# Patient Record
Sex: FEMALE | Race: WHITE | Employment: FULL TIME | URBAN - METROPOLITAN AREA
[De-identification: names, ages, dates, MRNs, and addresses within clinical notes are randomized per-mention and may not be internally consistent; named-entity substitution may affect disease eponyms.]

---
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
## 2022-01-25 ENCOUNTER — TELEPHONE (OUTPATIENT)
Dept: FAMILY MEDICINE CLINIC | Age: 22
End: 2022-01-25

## 2022-01-25 DIAGNOSIS — Z11.1 SCREENING-PULMONARY TB: Primary | ICD-10-CM

## 2022-01-28 ENCOUNTER — TELEPHONE (OUTPATIENT)
Dept: FAMILY MEDICINE CLINIC | Age: 22
End: 2022-01-28

## 2022-01-28 NOTE — TELEPHONE ENCOUNTER
As previously advised, patient needs to come either to office or go to the hospital lab for a blood draw to ensure she does not have TB. Once this is completed, I can fax the form to her employer. Please re-advise patient. Thanks!     Electronically signed by Benjamin Pierre MD on 1/28/22 at 9:40 AM EST

## 2022-01-28 NOTE — TELEPHONE ENCOUNTER
----- Message from Terri Ramsey sent at 1/28/2022  7:35 AM EST -----  Subject: Message to Provider    QUESTIONS  Information for Provider? Patient is calling, her employer faxed a form   that needs filled out and returned so she can work. She would like to   check the status of this form. Please call patient and advise.   ---------------------------------------------------------------------------  --------------  CALL BACK INFO  What is the best way for the office to contact you? OK to leave message on   voicemail  Preferred Call Back Phone Number? [phone]  ---------------------------------------------------------------------------  --------------  SCRIPT ANSWERS  Relationship to Patient?  Self

## 2022-02-01 ENCOUNTER — TELEPHONE (OUTPATIENT)
Dept: FAMILY MEDICINE CLINIC | Age: 22
End: 2022-02-01

## 2022-02-01 NOTE — TELEPHONE ENCOUNTER
Patient advised to get TB test again. Given number to [facility]. E's lab for hours. [phone]. Advised I can fax her paper back when this is finalized.     Electronically signed by Adonis Ridley MD on 2/1/22 at 4:25 PM EST

## 2022-02-01 NOTE — TELEPHONE ENCOUNTER
Patient phoned would like the paper from her work that was faxed to the office back  I do not see the paper   Do you have it  Please advise  Thank you April

## 2022-02-08 ENCOUNTER — OFFICE VISIT (OUTPATIENT)
Dept: FAMILY MEDICINE CLINIC | Age: 22
End: 2022-02-08
Payer: MEDICAID

## 2022-02-08 ENCOUNTER — HOSPITAL ENCOUNTER (EMERGENCY)
Age: 22
Discharge: HOME | End: 2022-02-08
Payer: MEDICAID

## 2022-02-08 VITALS
OXYGEN SATURATION: 98 % | BODY MASS INDEX: 31.98 KG/M2 | HEART RATE: 111 BPM | SYSTOLIC BLOOD PRESSURE: 134 MMHG | DIASTOLIC BLOOD PRESSURE: 78 MMHG | WEIGHT: 199 LBS | TEMPERATURE: 98.1 F | HEIGHT: 66 IN

## 2022-02-08 VITALS
SYSTOLIC BLOOD PRESSURE: 118 MMHG | RESPIRATION RATE: 14 BRPM | DIASTOLIC BLOOD PRESSURE: 66 MMHG | TEMPERATURE: 98.5 F | HEART RATE: 96 BPM | WEIGHT: 190 LBS | OXYGEN SATURATION: 99 % | BODY MASS INDEX: 30.67 KG/M2

## 2022-02-08 DIAGNOSIS — Z11.1 SCREENING-PULMONARY TB: Primary | ICD-10-CM

## 2022-02-08 DIAGNOSIS — Z20.822 ENCOUNTER FOR LABORATORY TESTING FOR COVID-19 VIRUS: Primary | ICD-10-CM

## 2022-02-08 DIAGNOSIS — F33.1 MODERATE EPISODE OF RECURRENT MAJOR DEPRESSIVE DISORDER (HCC): ICD-10-CM

## 2022-02-08 PROCEDURE — 99212 OFFICE O/P EST SF 10 MIN: CPT | Performed by: FAMILY MEDICINE

## 2022-02-08 PROCEDURE — G8484 FLU IMMUNIZE NO ADMIN: HCPCS | Performed by: FAMILY MEDICINE

## 2022-02-08 PROCEDURE — 99283 EMERGENCY DEPT VISIT LOW MDM: CPT

## 2022-02-08 PROCEDURE — U0003 INFECTIOUS AGENT DETECTION BY NUCLEIC ACID (DNA OR RNA); SEVERE ACUTE RESPIRATORY SYNDROME CORONAVIRUS 2 (SARS-COV-2) (CORONAVIRUS DISEASE [COVID-19]), AMPLIFIED PROBE TECHNIQUE, MAKING USE OF HIGH THROUGHPUT TECHNOLOGIES AS DESCRIBED BY CMS-2020-01-R: HCPCS

## 2022-02-08 PROCEDURE — G8427 DOCREV CUR MEDS BY ELIG CLIN: HCPCS | Performed by: FAMILY MEDICINE

## 2022-02-08 PROCEDURE — U0005 INFEC AGEN DETEC AMPLI PROBE: HCPCS

## 2022-02-08 PROCEDURE — 99213 OFFICE O/P EST LOW 20 MIN: CPT | Performed by: FAMILY MEDICINE

## 2022-02-08 PROCEDURE — 1036F TOBACCO NON-USER: CPT | Performed by: FAMILY MEDICINE

## 2022-02-08 PROCEDURE — G8417 CALC BMI ABV UP PARAM F/U: HCPCS | Performed by: FAMILY MEDICINE

## 2022-02-08 RX ORDER — ESCITALOPRAM OXALATE 5 MG/1
5 TABLET ORAL DAILY
Qty: 30 TABLET | Refills: 0 | Status: CANCELLED | OUTPATIENT
Start: 2022-02-08

## 2022-02-08 RX ORDER — SERTRALINE HYDROCHLORIDE 25 MG/1
25 TABLET, FILM COATED ORAL DAILY
Qty: 30 TABLET | Refills: 3 | Status: SHIPPED
Start: 2022-02-08 | End: 2022-04-08

## 2022-02-08 NOTE — ED PROVIDER NOTES
[facility]  Department of Emergency Medicine   ED  Encounter Note  Admit Date/RoomTime: 2022  4:01 PM  ED Room: [place] [address]  NAME: Mo Pedraza  : 2000  MRN: [id number]     Chief Complaint:  Other (job required tb before hire. )    [facility]        Mo Pedraza is a 25 y.o. female who presents to the ED by private vehicle for stating that she needs a TB test required to start her job at a . She also states she needs a COVID-19 test.. The complaint has been none and are none in severity. Patient denies any symptoms. Denies being exposed to COVID-19 or to tuberculosis. She denies any shortness of breath, chest pain, lightness, dizziness, neck pain, night sweats, fevers, chills, abdominal pain, numbness, tingling, weakness. ROS   Pertinent positives and negatives are stated within HPI, all other systems reviewed and are negative. Past Medical History:  has a past medical history of Moderate episode of recurrent major depressive disorder ([facility] [address] Surgical History:  has no past surgical history on file. Social History:  reports that she is a non-smoker but has been exposed to tobacco smoke. She has never used smokeless tobacco. She reports that she does not drink alcohol and does not use drugs. Family History: family history includes Kidney Disease in her maternal grandfather; Other in her maternal grandmother. Allergies: Patient has no known allergies. PHYSICAL EXAM   Oxygen Saturation Interpretation: Normal on room air analysis. ED Triage Vitals   BP Temp Temp src Heart Rate Resp SpO2 Height Weight   -- -- -- 22 1557 22 1557 22 1557 -- 22 1559      (!) 115 20 95 %  190 lb (86.2 kg)         Physical Exam  Constitutional/General: Alert and oriented x3, well appearing, non toxic  HEENT:  NC/NT. PERRLA,  Airway patent.   Neck: Supple, full ROM, non tender to palpation in the midline, no stridor, no crepitus, no meningeal signs  Respiratory: Lungs clear to auscultation bilaterally, no wheezes, rales, or rhonchi. Not in respiratory distress  CV:  Regular rate. Regular rhythm. No murmurs, gallops, or rubs. 2+ distal pulses  Chest: No chest wall tenderness  GI:  Abdomen Soft, Non tender, Non distended. +BS. No rebound, guarding, or rigidity. No pulsatile masses. Musculoskeletal: Moves all extremities x 4. Warm and well perfused, no clubbing, cyanosis, or edema. Capillary refill <3 seconds  Integument: skin warm and dry. No rashes. Lymphatic: no lymphadenopathy noted  Neurologic: GCS 15, no focal deficits, symmetric strength 5/5 in the upper and lower extremities bilaterally  Psychiatric: Normal Affect    Lab / Imaging Results   (All laboratory and radiology results have been personally reviewed by myself)  Labs:  No results found for this visit on 02/08/22. Imaging: All Radiology results interpreted by Radiologist unless otherwise noted. No orders to display       ED Course / Medical Decision Making   Medications - No data to display     Re-examination:  2/8/22       Time: 1610-spoke with lab and states that we cannot do the blood work for the [address] [place] [address] Pky for TB due to it has to be a same-day send out and be completed in the morning. They state they already did the send out for the day. Discussed with patient that [facility] [address] due to her being a resident update immunizations and TB screenings. Consult(s):   None    Procedure(s):   none    MDM:   Patient presents to the ED for needing a Covid test TB test to start a job at [employer]. Differential diagnoses included but not limited to COVID-19 versus TB. Workup in the ED revealed patient states she was not exposed to TB or COVID-19. She has no symptoms. Vital signs are stable. COVID-19 test is pending at this time. Unfortunately unable to do QuantiFERON TB test due to the time a day at this facility. Referred her to [facility] [address] for further treatment due to patient living in [place]. Patient continues to be non-toxic on re-evaluation. Findings were discussed with the patient and reasons to immediately return to the ED were articulated to them. They will follow-up with their PMD.      Plan of Care/Counseling:  SONIA Zhu CNP reviewed today's visit with the patient in addition to providing specific details for the plan of care and counseling regarding the diagnosis and prognosis. Questions are answered at this time and are agreeable with the plan. ASSESSMENT     1. Encounter for laboratory testing for COVID-19 virus      PLAN   Discharged home. Patient condition is stable    New Medications     Discharge Medication List as of 2/8/2022  4:25 PM        Electronically signed by SONIA Zhu CNP   DD: 2/8/22  **This report was transcribed using voice recognition software. Every effort was made to ensure accuracy; however, inadvertent computerized transcription errors may be present.   END OF ED PROVIDER NOTE       SONIA Zhu CNP  02/08/22 0630

## 2022-02-08 NOTE — PROGRESS NOTES
Attending Physician Statement    S:   Chief Complaint   Patient presents with    Depression     F/U    Other     immunization      21/F - depression/anxiety - denies SI/HI  O: Blood pressure 134/78, pulse (!) 111, temperature 98.1 °F (36.7 °C), temperature source Temporal, height 5' 6\" (1.676 m), weight 199 lb (90.3 kg), SpO2 98 %, not currently breastfeeding. Exam:   Heart - RRR   Lungs - clear   Psych - normal mood/affect, mildly anxious  A: As above  P:  D/C Lexapro   Start Zoloft 25mg QD    Follow-up as ordered    I have discussed the case, including pertinent history and exam findings with the resident. I agree with the documented assessment and plan.

## 2022-02-08 NOTE — PATIENT INSTRUCTIONS
Call in the meantime if you have any issues. TIPS TO COPE WITH STRESS    Stress can have negative effects on your physical and emotional health. Here are some tips to help you cope with stress:    1. RELAX. Find at least one activity that you find relaxing. Try to avoid TV, computer, or smart devices for relaxation. Do relaxation breathing and mental imagery. Take slow breaths in through your nose and out through your mouth. Imagine your favorite scent while inhaling, and blowing out a candle as you exhale. 2.   THINK ABOUT YOUR THINKING. Thoughts that are overly negative or pessimistic can contribute to stress. Avoid making assumptions. Focus on the facts available to you. 3.    PROBLEM-SOLVE. Feeling overwhelmed with daily hassles and problems can leave you feeling stressed. Focus on solving the problems that are within your control. Gather needed information on the problem, consider all possible solutions, and weigh the pros and cons of each option. 4.    GET PLENTY OF SLEEP. Most of adults need 6-8 hours or more of sleep per night. Wind down before bed. Avoid TV or smart devices in bed. Keep a regular sleep schedule. Limit caffeine. 5.    EAT HEALTHY. Limit fat, sugar, and sodium. Eat a balanced diet of fruits, vegetables, whole grains, and lean protein. 6. EXERCISE REGULARLY. Talk to your doctor about a safe exercise program.        7.    AVOID DRUGS AND ALCOHOL. While they may seem to help temporarily, drugs and alcohol will make existing stress worse. 8.    CONNECT WITH OTHERS. Talk with friends or family about your concerns. Socialize with others. 9.    TAKE A BREAK. If you are feeling overwhelmed with commitments, evaluate what is most important. What can you cut out? Where can you say \"no?\"      10. CONSIDER PROFESSIONAL HELP. If you continue to feel like you cannot manage stress, talk further with your doctor.   Or, consider talk therapy from a psychologist or professional counselor. If you have any thoughts of harming yourself or others, call 911 immediately.

## 2022-02-08 NOTE — PROGRESS NOTES
[address] [facility]  Department of Family Medicine  Family Medicine Residency Program      Patient:  Maico Gracia 25 y.o. female     Date of Service: 2/8/22      Chief complaint:   Chief Complaint   Patient presents with    Depression     F/U    Other     immunization         History of Present Illness   The patient is a 25 y.o. female with a PMH of anxiety and depression who presents to the clinic for the following medical concerns:     Anxiety and depression: Has been taking 5mg Lexapro for past month. No side effects but it is not helping. 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 Still depressed. Cant do counseling because of her job. Does have decreased motivation. No SI at this time but just feeling down. Still doesn't have good support from mother or any friends. She isolates because she doesn't like to feel judged by others. New job not giving her enough time off to get counseling. Advised that we can do virtuals if she can get 15 minutes free. Amenable to trying different medication.  Need for TB test: Hasn't been able to get time off work to get this done. Past Medical History:      Diagnosis Date    Moderate episode of recurrent major depressive disorder ([facility] [address] 11/26/2021       Past Surgical History:    History reviewed. No pertinent surgical history. Allergies:    Patient has no known allergies. Social History:   Social History     Tobacco Use    Smoking status: Passive Smoke Exposure - Never Smoker    Smokeless tobacco: Never Used   Substance Use Topics    Alcohol use: No        Family History:       Problem Relation Age of Onset    Other Maternal Grandmother         enlarged thyroid    Kidney Disease Maternal Grandfather        Reviewof Systems:   Review of Systems Negative unless otherwise stated in HPI.     Physical Exam   Vitals: /78 (Site: Left Upper Arm, Position: Sitting, Cuff Size: Medium Adult)   Pulse (!) 111 Temp 98.1 °F (36.7 °C) (Temporal)   Ht 5' 6\" (1.676 m)   Wt 199 lb (90.3 kg)   LMP 01/17/2022   SpO2 98%   BMI 32.12 kg/m²        Physical Exam  Constitutional:       Appearance: Normal appearance. Eyes:      Extraocular Movements: Extraocular movements intact. Conjunctiva/sclera: Conjunctivae normal.   Cardiovascular:      Rate and Rhythm: Normal rate and regular rhythm. Heart sounds: No murmur heard. No friction rub. No gallop. Pulmonary:      Effort: Pulmonary effort is normal.      Breath sounds: Normal breath sounds. Abdominal:      General: Abdomen is flat. Palpations: Abdomen is soft. Musculoskeletal:         General: No swelling or tenderness. Cervical back: Normal range of motion and neck supple. Right lower leg: No edema. Left lower leg: No edema. Skin:     General: Skin is warm and dry. Neurological:      Mental Status: She is alert and oriented to person, place, and time. Mental status is at baseline. Psychiatric:         Mood and Affect: Mood normal.         Thought Content: Thought content normal.          Assessment and Plan       1. Moderate episode of recurrent major depressive disorder ([address]  Uncontrolled  Stop lexapro  Start sertraline 25mg  Advised counseling would be of benefit, but job is limiting  Will do virtual visits in between to allow for maintaining her job  No SI at this time  Follow up closely, may uptitrate medication via phone call in next few weeks    2. Screening-pulmonary TB  Cannot to [facility] in office, prefer over skin test due to patient unable to follow up with work  Advised to go to hospital lab, was given phone number on prior telephone encounter to determine when she can get this done  - T-Spot TB Test; Future        Return to Office: Return in about 1 month (around 3/8/2022) for vv f/u depression.       Medication List:    Current Outpatient Medications   Medication Sig Dispense Refill    sertraline (ZOLOFT) 25 MG tablet Take 1 tablet by mouth daily 30 tablet 3    ibuprofen (IBU) 800 MG tablet Take 1 tablet by mouth every 8 hours as needed for Pain 21 tablet 0     No current facility-administered medications for this visit.         Duarte Gilliland MD

## 2022-02-09 LAB — SARS-COV-2, PCR: NOT DETECTED

## 2022-02-28 ENCOUNTER — HOSPITAL ENCOUNTER (EMERGENCY)
Age: 22
Discharge: HOME | End: 2022-02-28
Payer: MEDICAID

## 2022-02-28 VITALS
OXYGEN SATURATION: 99 % | TEMPERATURE: 98.1 F | WEIGHT: 190 LBS | HEIGHT: 66 IN | HEART RATE: 99 BPM | RESPIRATION RATE: 16 BRPM | BODY MASS INDEX: 30.53 KG/M2 | SYSTOLIC BLOOD PRESSURE: 130 MMHG | DIASTOLIC BLOOD PRESSURE: 77 MMHG

## 2022-02-28 DIAGNOSIS — J02.9 ACUTE PHARYNGITIS, UNSPECIFIED ETIOLOGY: Primary | ICD-10-CM

## 2022-02-28 LAB — STREP GRP A PCR: NEGATIVE

## 2022-02-28 PROCEDURE — 99284 EMERGENCY DEPT VISIT MOD MDM: CPT

## 2022-02-28 PROCEDURE — 6370000000 HC RX 637 (ALT 250 FOR IP): Performed by: PHYSICIAN ASSISTANT

## 2022-02-28 PROCEDURE — 87880 STREP A ASSAY W/OPTIC: CPT

## 2022-02-28 RX ORDER — NAPROXEN 500 MG/1
500 TABLET ORAL 2 TIMES DAILY PRN
Qty: 28 TABLET | Refills: 0 | Status: SHIPPED | OUTPATIENT
Start: 2022-02-28 | End: 2022-04-08

## 2022-02-28 RX ORDER — NAPROXEN 500 MG/1
500 TABLET ORAL ONCE
Status: COMPLETED | OUTPATIENT
Start: 2022-02-28 | End: 2022-02-28

## 2022-02-28 RX ORDER — AMOXICILLIN 500 MG/1
500 CAPSULE ORAL 2 TIMES DAILY
Qty: 14 CAPSULE | Refills: 0 | Status: SHIPPED | OUTPATIENT
Start: 2022-02-28 | End: 2022-03-07

## 2022-02-28 RX ADMIN — NAPROXEN 500 MG: 500 TABLET ORAL at 18:34

## 2022-02-28 ASSESSMENT — ENCOUNTER SYMPTOMS
SHORTNESS OF BREATH: 0
COLOR CHANGE: 0
SORE THROAT: 1
SINUS PAIN: 0
CHEST TIGHTNESS: 0
SINUS PRESSURE: 0
TROUBLE SWALLOWING: 0
FACIAL SWELLING: 0
COUGH: 0
BACK PAIN: 0

## 2022-02-28 ASSESSMENT — PAIN SCALES - GENERAL
PAINLEVEL_OUTOF10: 7
PAINLEVEL_OUTOF10: 7

## 2022-02-28 ASSESSMENT — PAIN DESCRIPTION - LOCATION: LOCATION: THROAT

## 2022-02-28 ASSESSMENT — PAIN DESCRIPTION - FREQUENCY: FREQUENCY: CONTINUOUS

## 2022-02-28 NOTE — ED PROVIDER NOTES
[facility]        Department of Emergency Medicine   ED  Provider Note  Admit Date/RoomTime: 2/28/2022  6:05 PM  ED Room: [place] [address]  HPI:  2/28/22, Time: 6:58 PM EST      The patient is an otherwise healthy 68-year-old female presenting the emergency department with a 2-day history of a sore throat. Patient states she feels like she has strep throat. She has had this in the past.  States it is painful to swallow. She does not take anything for her symptoms. She denies any ill contacts. She also denies any drooling, voice changes, fevers or chills, cough, congestion, chest pain, shortness of breath. The history is provided by the patient. No  was used. REVIEW OF SYSTEMS:  Review of Systems   Constitutional: Negative for activity change, chills, fatigue and fever. HENT: Positive for sore throat. Negative for congestion, ear pain, facial swelling, sinus pressure, sinus pain and trouble swallowing. Respiratory: Negative for cough, chest tightness and shortness of breath. Cardiovascular: Negative for chest pain, palpitations and leg swelling. Genitourinary: Negative for dysuria, flank pain, frequency and hematuria. Musculoskeletal: Negative for back pain, neck pain and neck stiffness. Skin: Negative for color change, pallor and rash. Neurological: Negative for dizziness, weakness, light-headedness and headaches. Psychiatric/Behavioral: Negative for agitation, behavioral problems and confusion. Pertinent positives and negatives are stated within HPI, all other systems reviewed and are negative.      --------------------------------------------- PAST HISTORY ---------------------------------------------  Past Medical History:  has a past medical history of Moderate episode of recurrent major depressive disorder ([facility] [address] Past Surgical History:  has no past surgical history on file.     Social History:  reports that she is a non-smoker but has been exposed to tobacco smoke. She has never used smokeless tobacco. She reports that she does not drink alcohol and does not use drugs. Family History: family history includes Kidney Disease in her maternal grandfather; Other in her maternal grandmother. The patients home medications have been reviewed. Allergies: Patient has no known allergies. -------------------------------------------------- RESULTS -------------------------------------------------  All laboratory and radiology results have been personally reviewed by myself   LABS:  Results for orders placed or performed during the hospital encounter of 02/28/22   Strep Screen Group A Throat    Specimen: Throat   Result Value Ref Range    Strep Grp A PCR Negative Negative       RADIOLOGY:  Interpreted by Radiologist.  No orders to display       ------------------------- NURSING NOTES AND VITALS REVIEWED ---------------------------   The nursing notes within the ED encounter and vital signs as below have been reviewed. /77   Pulse 99   Temp 98.1 °F (36.7 °C) (Oral)   Resp 16   Ht 5' 6\" (1.676 m)   Wt 190 lb (86.2 kg)   SpO2 99%   BMI 30.67 kg/m²   Oxygen Saturation Interpretation: Normal      ---------------------------------------------------PHYSICAL EXAM--------------------------------------    Physical Exam  Vitals and nursing note reviewed. Constitutional:       General: She is not in acute distress. Appearance: Normal appearance. She is well-developed. HENT:      Head: Normocephalic and atraumatic. Mouth/Throat:      Pharynx: Oropharyngeal exudate and posterior oropharyngeal erythema present. Comments: Diffuse erythema of the posterior oropharynx. No significant tonsillar edema. Right-sided tonsillar exudates. Midline uvula. Neck:      Vascular: No JVD. Trachea: No tracheal deviation. Comments: Right-sided cervical lymphadenopathy. Cardiovascular:      Rate and Rhythm: Normal rate and regular rhythm.       Heart sounds: Normal heart sounds. No murmur heard. Pulmonary:      Effort: Pulmonary effort is normal. No respiratory distress. Breath sounds: Normal breath sounds. Musculoskeletal:         General: No deformity. Normal range of motion. Cervical back: Normal range of motion and neck supple. Skin:     General: Skin is warm and dry. Capillary Refill: Capillary refill takes less than 2 seconds. Coloration: Skin is not pale. Findings: No erythema. Neurological:      Mental Status: She is alert and oriented to person, place, and time. Cranial Nerves: No cranial nerve deficit. Psychiatric:         Mood and Affect: Mood normal.         Behavior: Behavior normal.         Thought Content: Thought content normal.            ------------------------------ ED COURSE/MEDICAL DECISION MAKING----------------------  Medications   naproxen (NAPROSYN) tablet 500 mg (500 mg Oral Given 2/28/22 1834)         ED COURSE:      No orders to display         Procedures:  Procedures     Medical Decision Making:   MDM   79-year-old female presenting the ED with a sore throat for the last several days. Patient is afebrile and hemodynamically stable on arrival.  She does have erythema and exudates. Rapid strep is negative but patient states she has a strong history of frequent strep infections. There is concern for false negative. She has no signs of peritonsillar abscess. Patient will be treated with amoxicillin and naproxen. She is to return with any new or worsening symptoms. Counseling: The emergency provider has spoken with the patient and discussed todays results, in addition to providing specific details for the plan of care and counseling regarding the diagnosis and prognosis. Questions are answered at this time and they are agreeable with the plan.      --------------------------------- IMPRESSION AND DISPOSITION ---------------------------------    IMPRESSION  1.  Acute pharyngitis, unspecified etiology        DISPOSITION  Disposition: Discharge to home  Patient condition is good      Electronically signed by Dotty Navarro PA-C   DD: 2/28/22  **This report was transcribed using voice recognition software. Every effort was made to ensure accuracy; however, inadvertent computerized transcription errors may be present.   END OF ED PROVIDER NOTE          Dotty Navarro PA-C  02/28/22 1463

## 2022-04-08 ENCOUNTER — HOSPITAL ENCOUNTER (EMERGENCY)
Age: 22
Discharge: HOME | End: 2022-04-09
Attending: EMERGENCY MEDICINE
Payer: MEDICAID

## 2022-04-08 DIAGNOSIS — E87.6 HYPOKALEMIA: ICD-10-CM

## 2022-04-08 DIAGNOSIS — R10.9 ABDOMINAL PAIN, UNSPECIFIED ABDOMINAL LOCATION: ICD-10-CM

## 2022-04-08 DIAGNOSIS — R11.2 NAUSEA VOMITING AND DIARRHEA: Primary | ICD-10-CM

## 2022-04-08 DIAGNOSIS — R19.7 NAUSEA VOMITING AND DIARRHEA: Primary | ICD-10-CM

## 2022-04-08 LAB
ALBUMIN SERPL-MCNC: 4.6 G/DL (ref 3.5–5.2)
ALP BLD-CCNC: 71 U/L (ref 35–104)
ALT SERPL-CCNC: 11 U/L (ref 0–32)
ANION GAP SERPL CALCULATED.3IONS-SCNC: 13 MMOL/L (ref 7–16)
AST SERPL-CCNC: 19 U/L (ref 0–31)
BACTERIA: ABNORMAL /HPF
BASOPHILS ABSOLUTE: 0.02 E9/L (ref 0–0.2)
BASOPHILS RELATIVE PERCENT: 0.4 % (ref 0–2)
BILIRUB SERPL-MCNC: 0.4 MG/DL (ref 0–1.2)
BILIRUBIN URINE: NEGATIVE
BLOOD, URINE: ABNORMAL
BUN BLDV-MCNC: 8 MG/DL (ref 6–20)
CALCIUM SERPL-MCNC: 9.4 MG/DL (ref 8.6–10.2)
CHLORIDE BLD-SCNC: 106 MMOL/L (ref 98–107)
CLARITY: CLEAR
CO2: 20 MMOL/L (ref 22–29)
COLOR: YELLOW
CREAT SERPL-MCNC: 0.7 MG/DL (ref 0.5–1)
EOSINOPHILS ABSOLUTE: 0.03 E9/L (ref 0.05–0.5)
EOSINOPHILS RELATIVE PERCENT: 0.5 % (ref 0–6)
GFR AFRICAN AMERICAN: >60
GFR NON-AFRICAN AMERICAN: >60 ML/MIN/1.73
GLUCOSE BLD-MCNC: 132 MG/DL (ref 74–99)
GLUCOSE URINE: NEGATIVE MG/DL
HCG, URINE, POC: NEGATIVE
HCT VFR BLD CALC: 41.3 % (ref 34–48)
HEMOGLOBIN: 13.9 G/DL (ref 11.5–15.5)
IMMATURE GRANULOCYTES #: 0.01 E9/L
IMMATURE GRANULOCYTES %: 0.2 % (ref 0–5)
KETONES, URINE: ABNORMAL MG/DL
LACTIC ACID: 0.7 MMOL/L (ref 0.5–2.2)
LEUKOCYTE ESTERASE, URINE: NEGATIVE
LIPASE: 29 U/L (ref 13–60)
LYMPHOCYTES ABSOLUTE: 1.22 E9/L (ref 1.5–4)
LYMPHOCYTES RELATIVE PERCENT: 22 % (ref 20–42)
Lab: NORMAL
MCH RBC QN AUTO: 28 PG (ref 26–35)
MCHC RBC AUTO-ENTMCNC: 33.7 % (ref 32–34.5)
MCV RBC AUTO: 83.3 FL (ref 80–99.9)
MONOCYTES ABSOLUTE: 0.71 E9/L (ref 0.1–0.95)
MONOCYTES RELATIVE PERCENT: 12.8 % (ref 2–12)
MUCUS: PRESENT /LPF
NEGATIVE QC PASS/FAIL: NORMAL
NEUTROPHILS ABSOLUTE: 3.56 E9/L (ref 1.8–7.3)
NEUTROPHILS RELATIVE PERCENT: 64.1 % (ref 43–80)
NITRITE, URINE: NEGATIVE
PDW BLD-RTO: 12 FL (ref 11.5–15)
PH UA: 6 (ref 5–9)
PLATELET # BLD: 266 E9/L (ref 130–450)
PMV BLD AUTO: 9.4 FL (ref 7–12)
POSITIVE QC PASS/FAIL: NORMAL
POTASSIUM SERPL-SCNC: 3.4 MMOL/L (ref 3.5–5)
PROTEIN UA: NEGATIVE MG/DL
RBC # BLD: 4.96 E12/L (ref 3.5–5.5)
RBC UA: ABNORMAL /HPF (ref 0–2)
SODIUM BLD-SCNC: 139 MMOL/L (ref 132–146)
SPECIFIC GRAVITY UA: >=1.03 (ref 1–1.03)
STREP GRP A PCR: NEGATIVE
TOTAL PROTEIN: 8.2 G/DL (ref 6.4–8.3)
UROBILINOGEN, URINE: 0.2 E.U./DL
WBC # BLD: 5.6 E9/L (ref 4.5–11.5)
WBC UA: ABNORMAL /HPF (ref 0–5)

## 2022-04-08 PROCEDURE — 96372 THER/PROPH/DIAG INJ SC/IM: CPT

## 2022-04-08 PROCEDURE — 6370000000 HC RX 637 (ALT 250 FOR IP): Performed by: NURSE PRACTITIONER

## 2022-04-08 PROCEDURE — 6360000002 HC RX W HCPCS: Performed by: NURSE PRACTITIONER

## 2022-04-08 PROCEDURE — 81001 URINALYSIS AUTO W/SCOPE: CPT

## 2022-04-08 PROCEDURE — 36415 COLL VENOUS BLD VENIPUNCTURE: CPT

## 2022-04-08 PROCEDURE — 2580000003 HC RX 258: Performed by: NURSE PRACTITIONER

## 2022-04-08 PROCEDURE — 80053 COMPREHEN METABOLIC PANEL: CPT

## 2022-04-08 PROCEDURE — 85025 COMPLETE CBC W/AUTO DIFF WBC: CPT

## 2022-04-08 PROCEDURE — 83690 ASSAY OF LIPASE: CPT

## 2022-04-08 PROCEDURE — 99283 EMERGENCY DEPT VISIT LOW MDM: CPT

## 2022-04-08 PROCEDURE — 83605 ASSAY OF LACTIC ACID: CPT

## 2022-04-08 PROCEDURE — 87880 STREP A ASSAY W/OPTIC: CPT

## 2022-04-08 PROCEDURE — 96374 THER/PROPH/DIAG INJ IV PUSH: CPT

## 2022-04-08 RX ORDER — 0.9 % SODIUM CHLORIDE 0.9 %
1000 INTRAVENOUS SOLUTION INTRAVENOUS ONCE
Status: COMPLETED | OUTPATIENT
Start: 2022-04-08 | End: 2022-04-09

## 2022-04-08 RX ORDER — ONDANSETRON 4 MG/1
4 TABLET, ORALLY DISINTEGRATING ORAL ONCE
Status: COMPLETED | OUTPATIENT
Start: 2022-04-08 | End: 2022-04-08

## 2022-04-08 RX ORDER — POTASSIUM CHLORIDE 20 MEQ/1
40 TABLET, EXTENDED RELEASE ORAL ONCE
Status: DISCONTINUED | OUTPATIENT
Start: 2022-04-08 | End: 2022-04-09 | Stop reason: HOSPADM

## 2022-04-08 RX ORDER — DICYCLOMINE HYDROCHLORIDE 10 MG/ML
20 INJECTION INTRAMUSCULAR ONCE
Status: COMPLETED | OUTPATIENT
Start: 2022-04-08 | End: 2022-04-08

## 2022-04-08 RX ORDER — ONDANSETRON 2 MG/ML
4 INJECTION INTRAMUSCULAR; INTRAVENOUS ONCE
Status: COMPLETED | OUTPATIENT
Start: 2022-04-08 | End: 2022-04-08

## 2022-04-08 RX ADMIN — DICYCLOMINE HYDROCHLORIDE 20 MG: 20 INJECTION, SOLUTION INTRAMUSCULAR at 23:38

## 2022-04-08 RX ADMIN — SODIUM CHLORIDE 1000 ML: 9 INJECTION, SOLUTION INTRAVENOUS at 23:15

## 2022-04-08 RX ADMIN — ONDANSETRON 4 MG: 2 INJECTION INTRAMUSCULAR; INTRAVENOUS at 23:16

## 2022-04-08 RX ADMIN — ONDANSETRON 4 MG: 4 TABLET, ORALLY DISINTEGRATING ORAL at 20:59

## 2022-04-09 ENCOUNTER — APPOINTMENT (OUTPATIENT)
Dept: CT IMAGING | Age: 22
End: 2022-04-09
Payer: MEDICAID

## 2022-04-09 VITALS
BODY MASS INDEX: 30.53 KG/M2 | TEMPERATURE: 98.3 F | HEIGHT: 66 IN | HEART RATE: 99 BPM | SYSTOLIC BLOOD PRESSURE: 144 MMHG | RESPIRATION RATE: 16 BRPM | DIASTOLIC BLOOD PRESSURE: 90 MMHG | OXYGEN SATURATION: 98 % | WEIGHT: 190 LBS

## 2022-04-09 PROCEDURE — 99284 EMERGENCY DEPT VISIT MOD MDM: CPT | Performed by: SURGERY

## 2022-04-09 PROCEDURE — 74177 CT ABD & PELVIS W/CONTRAST: CPT

## 2022-04-09 PROCEDURE — 6360000004 HC RX CONTRAST MEDICATION: Performed by: RADIOLOGY

## 2022-04-09 PROCEDURE — 2580000003 HC RX 258: Performed by: NURSE PRACTITIONER

## 2022-04-09 RX ORDER — 0.9 % SODIUM CHLORIDE 0.9 %
1000 INTRAVENOUS SOLUTION INTRAVENOUS ONCE
Status: COMPLETED | OUTPATIENT
Start: 2022-04-09 | End: 2022-04-09

## 2022-04-09 RX ORDER — DICYCLOMINE HYDROCHLORIDE 10 MG/1
10 CAPSULE ORAL 3 TIMES DAILY
Qty: 15 CAPSULE | Refills: 3 | Status: SHIPPED | OUTPATIENT
Start: 2022-04-09 | End: 2022-07-19

## 2022-04-09 RX ORDER — ONDANSETRON 4 MG/1
4 TABLET, ORALLY DISINTEGRATING ORAL EVERY 8 HOURS PRN
Qty: 9 TABLET | Refills: 0 | Status: SHIPPED | OUTPATIENT
Start: 2022-04-09 | End: 2022-04-12

## 2022-04-09 RX ORDER — METRONIDAZOLE 500 MG/1
500 TABLET ORAL 3 TIMES DAILY
Qty: 30 TABLET | Refills: 0 | Status: SHIPPED | OUTPATIENT
Start: 2022-04-09 | End: 2022-04-19

## 2022-04-09 RX ADMIN — SODIUM CHLORIDE 1000 ML: 9 INJECTION, SOLUTION INTRAVENOUS at 01:46

## 2022-04-09 RX ADMIN — IOPAMIDOL 90 ML: 755 INJECTION, SOLUTION INTRAVENOUS at 00:37

## 2022-04-09 ASSESSMENT — ENCOUNTER SYMPTOMS
DIARRHEA: 1
VOMITING: 1
SHORTNESS OF BREATH: 0
COUGH: 0
ABDOMINAL PAIN: 1
ABDOMINAL DISTENTION: 1
RECTAL PAIN: 0
NAUSEA: 1
COLOR CHANGE: 0
CONSTIPATION: 0

## 2022-04-09 NOTE — ED NOTES
Department of Emergency Medicine  FIRST PROVIDER TRIAGE NOTE             [facility]           4/8/22  8:28 PM EDT    Date of Encounter: 4/8/22   MRN: [id number]      HPI: Ko Joseph is a 25 y.o. female who presents to the ED for Emesis (4 days) and Diarrhea  Patient presents with 4-day history of nausea, vomiting, diarrhea. Reports greater than 10 episodes of each today. No fevers. Unaware of any illness exposure. ROS: Negative for cp or sob. PE: Gen Appearance/Constitutional: alert  HEENT: NC/NT. PERRLA,  Airway patent. Initial Plan of Care: All treatment areas with department are currently occupied. Plan to order/Initiate the following while awaiting opening in ED: labs.   Initiate Treatment-Testing, Proceed toTreatment Area When Bed Available for ED Attending/LISSY to Continue Care    Electronically signed by SONIA Lemus CNP   DD: 4/8/22         SONIA Lemus CNP  04/08/22 2029  ATTENDING PROVIDER ATTESTATION:     Supervising Physician, on-site, available for consultation, non-participatory in the evaluation or care of this patient       Sula Rinne, MD  04/08/22 [id number]

## 2022-04-09 NOTE — CONSULTS
GENERAL SURGERY  CONSULT NOTE  4/9/2022    Physician Consulted: Dr. Kika Vang  Reason for Consult: evaluate for large bowel obstruction  Referring Physician: Dr. Benjy CASTANEDA  Adriel Louise is a 25 y.o. female who presents to the general surgery service for evaluation of abdominal pain. The patient is presenting to the emergency department with complaints of 4 days of nausea, vomiting, abdominal pain, and diarrhea. She reports 3-4 episodes of vomiting and ~10 episodes of diarrhea in the last 24 hours. She has never experienced these symptoms before. She has not eaten anything unusual or different than normal. She denies any known medical problems or allergies. She has never had surgery before. She denies any sick contacts. She is not currently on her menstrual period. She has not recently taken antibiotics. No personal or family history of ulcerative colitis or Crohn's disease. She denies alcohol, tobacco, or other recreational substance use. Following presentation to the ED, the patient received Zofran, which has resolved her nausea. She continues to have diarrhea. Past Medical History:   Diagnosis Date    Moderate episode of recurrent major depressive disorder ([facility] [address] 11/26/2021       History reviewed. No pertinent surgical history. Medications Prior to Admission:    Prior to Admission medications    Medication Sig Start Date End Date Taking?  Authorizing Provider   ibuprofen (IBU) 800 MG tablet Take 1 tablet by mouth every 8 hours as needed for Pain 6/3/21 6/10/21  SONIA Angelo - CNP       No Known Allergies    Family History   Problem Relation Age of Onset    Other Maternal Grandmother         enlarged thyroid    Kidney Disease Maternal Grandfather        Social History     Tobacco Use    Smoking status: Passive Smoke Exposure - Never Smoker    Smokeless tobacco: Never Used   Vaping Use    Vaping Use: Never used   Substance Use Topics    Alcohol use: No    Drug use: No         Review of Systems   Constitutional: Positive for appetite change. Negative for chills, fatigue and fever. Respiratory: Negative for cough and shortness of breath. Cardiovascular: Negative for chest pain and palpitations. Gastrointestinal: Positive for abdominal distention, abdominal pain, diarrhea, nausea and vomiting. Negative for constipation and rectal pain. Genitourinary: Negative for difficulty urinating and dysuria. Skin: Negative for color change and rash. Neurological: Negative for dizziness and weakness. Psychiatric/Behavioral: Negative for agitation, behavioral problems and confusion. PHYSICAL EXAM:    Vitals:    04/09/22 0001   BP: (!) 144/90   Pulse: 99   Resp: 16   Temp:    SpO2: 98%       General Appearance:  awake, alert, oriented, mildly uncomfortable  Skin:  Skin color, texture, turgor normal. No rashes or lesions. Head:  NCAT. No scleral icterus or conjunctival pallor  Lungs/Chest:  Normal expansion. No chest wall tenderness  Cardiovascular:  Warm throughout. No chest pain  Abdomen:  Soft, moderately diffuse tenderness , mildly distended. Distractable guarding. No palpable masses. Extremities: Extremities warm to touch with no edema. LABS:    CBC  Recent Labs     04/08/22 2056   WBC 5.6   HGB 13.9   HCT 41.3        BMP  Recent Labs     04/08/22 2056      K 3.4*      CO2 20*   BUN 8   CREATININE 0.7   CALCIUM 9.4     Liver Function  Recent Labs     04/08/22 2056   LIPASE 29   BILITOT 0.4   AST 19   ALT 11   ALKPHOS 71   PROT 8.2   LABALBU 4.6     No results for input(s): LACTATE in the last 72 hours. No results for input(s): INR, PTT in the last 72 hours. Invalid input(s): PT      I have personally reviewed all relevant labs and imaging. Mildly dilated colon with air fluid levels. Inflammation at the level of the rectum    ASSESSMENT:  25 y.o. female with gastroenteritis.  No concern for large bowel obstruction as she is passing gas and actively moving her bowels. PLAN:  - no acute surgical intervention indicated at this time.    - agree with IV fluid bolus  - trial regular diet  - okay for discharge from surgical perspective if able to tolerate sufficient intake of PO fluids and food    Electronically signed by Nima Curtis DO on 4/9/2022 at 3:18 AM

## 2022-04-09 NOTE — ED PROVIDER NOTES
ED Physician   HPI:  4/8/22, Time: 11:12 PM EDT         Tien Minor is a 25 y.o. female presenting to the ED for nausea, vomiting, diarrhea. Patient presents to the emergency department with complaints of nausea, vomiting, diarrhea that started 4 days ago. Patient reports greater than 10 episodes of each alone today. Denies noticing any blood in them. Denies any fevers. Unaware of any illness exposure. States she is attempted to eat and drink without success. Patient denies any chest pain or shortness of breath. Reports normal state of health up until this started occurring. Patient otherwise denies any chest pain, shortness of breath, no difficulty with urination she denies any blood in her urine denies any back or flank pain as well. Patient with symptoms moderate in severity and persistent. Review of Systems:   A complete review of systems was performed and pertinent positives and negatives are stated within HPI, all other systems reviewed and are negative.          --------------------------------------------- PAST HISTORY ---------------------------------------------  Past Medical History:  has a past medical history of Moderate episode of recurrent major depressive disorder ([facility] [address] Past Surgical History:  has no past surgical history on file. Social History:  reports that she is a non-smoker but has been exposed to tobacco smoke. She has never used smokeless tobacco. She reports that she does not drink alcohol and does not use drugs. Family History: family history includes Kidney Disease in her maternal grandfather; Other in her maternal grandmother. The patients home medications have been reviewed. Allergies: Patient has no known allergies.     -------------------------------------------------- RESULTS -------------------------------------------------  All laboratory and radiology results have been personally reviewed by myself   LABS:  Results for orders placed or performed during the hospital encounter of 04/08/22   Strep Screen Group A Throat    Specimen: Throat   Result Value Ref Range    Strep Grp A PCR Negative Negative   CBC with Auto Differential   Result Value Ref Range    WBC 5.6 4.5 - 11.5 E9/L    RBC 4.96 3.50 - 5.50 E12/L    Hemoglobin 13.9 11.5 - 15.5 g/dL    Hematocrit 41.3 34.0 - 48.0 %    MCV 83.3 80.0 - 99.9 fL    MCH 28.0 26.0 - 35.0 pg    MCHC 33.7 32.0 - 34.5 %    RDW 12.0 11.5 - 15.0 fL    Platelets 306 591 - 002 E9/L    MPV 9.4 7.0 - 12.0 fL    Neutrophils % 64.1 43.0 - 80.0 %    Immature Granulocytes % 0.2 0.0 - 5.0 %    Lymphocytes % 22.0 20.0 - 42.0 %    Monocytes % 12.8 (H) 2.0 - 12.0 %    Eosinophils % 0.5 0.0 - 6.0 %    Basophils % 0.4 0.0 - 2.0 %    Neutrophils Absolute 3.56 1.80 - 7.30 E9/L    Immature Granulocytes # 0.01 E9/L    Lymphocytes Absolute 1.22 (L) 1.50 - 4.00 E9/L    Monocytes Absolute 0.71 0.10 - 0.95 E9/L    Eosinophils Absolute 0.03 (L) 0.05 - 0.50 E9/L    Basophils Absolute 0.02 0.00 - 0.20 E9/L   Comprehensive Metabolic Panel   Result Value Ref Range    Sodium 139 132 - 146 mmol/L    Potassium 3.4 (L) 3.5 - 5.0 mmol/L    Chloride 106 98 - 107 mmol/L    CO2 20 (L) 22 - 29 mmol/L    Anion Gap 13 7 - 16 mmol/L    Glucose 132 (H) 74 - 99 mg/dL    BUN 8 6 - 20 mg/dL    CREATININE 0.7 0.5 - 1.0 mg/dL    GFR Non-African American >60 >=60 mL/min/1.73    GFR African American >60     Calcium 9.4 8.6 - 10.2 mg/dL    Total Protein 8.2 6.4 - 8.3 g/dL    Albumin 4.6 3.5 - 5.2 g/dL    Total Bilirubin 0.4 0.0 - 1.2 mg/dL    Alkaline Phosphatase 71 35 - 104 U/L    ALT 11 0 - 32 U/L    AST 19 0 - 31 U/L   Lactic Acid   Result Value Ref Range    Lactic Acid 0.7 0.5 - 2.2 mmol/L   Lipase   Result Value Ref Range    Lipase 29 13 - 60 U/L   Urinalysis   Result Value Ref Range    Color, UA Yellow Straw/Yellow    Clarity, UA Clear Clear    Glucose, Ur Negative Negative mg/dL    Bilirubin Urine Negative Negative    Ketones, Urine TRACE (A) Negative mg/dL Specific Gravity, UA >=1.030 1.005 - 1.030    Blood, Urine TRACE-INTACT Negative    pH, UA 6.0 5.0 - 9.0    Protein, UA Negative Negative mg/dL    Urobilinogen, Urine 0.2 <2.0 E.U./dL    Nitrite, Urine Negative Negative    Leukocyte Esterase, Urine Negative Negative   Microscopic Urinalysis   Result Value Ref Range    Mucus, UA Present (A) None Seen /LPF    WBC, UA 2-5 0 - 5 /HPF    RBC, UA 1-3 0 - 2 /HPF    Bacteria, UA MODERATE (A) None Seen /HPF   POC Pregnancy Urine   Result Value Ref Range    HCG, Urine, POC Negative Negative    Lot Number [id number]     Positive QC Pass/Fail Pass     Negative QC Pass/Fail Pass        RADIOLOGY:  Interpreted by Radiologist.  CT ABDOMEN PELVIS W IV CONTRAST Additional Contrast? None   Final Result   Rectal wall thickening, which may suggest infectious or inflammatory process. The colon proximal to this point is mildly distended and fluid-filled. The   possibility of large bowel obstruction cannot be excluded. No evidence of   free air are perirectal fluid collections. Follicular changes within the adnexa.             ------------------------- NURSING NOTES AND VITALS REVIEWED ---------------------------   The nursing notes within the ED encounter and vital signs as below have been reviewed. BP (!) 144/90   Pulse 99   Temp 98.3 °F (36.8 °C) (Oral)   Resp 16   Ht 5' 6\" (1.676 m)   Wt 190 lb (86.2 kg)   LMP 03/24/2022   SpO2 98%   BMI 30.67 kg/m²   Oxygen Saturation Interpretation: Normal      ---------------------------------------------------PHYSICAL EXAM--------------------------------------      Constitutional/General: Alert and oriented x3, mildly uncomfortable  Head: Normocephalic and atraumatic  Eyes: PERRL, EOMI  Mouth: Oropharynx erythematous no tonsillar swelling no exudate, handling secretions, no trismus  Neck: Supple, full ROM,   Pulmonary: Lungs clear to auscultation bilaterally, no wheezes, rales, or rhonchi.  Not in respiratory distress  Cardiovascular:  Regular rate and rhythm, no murmurs, gallops, or rubs. 2+ distal pulses  Abdomen: Soft, tender, non distended, point tenderness to all areas of the abdomen palpated. No unusual bulge or area of pulsation. Negative for CVA tenderness  Extremities: Moves all extremities x 4. Warm and well perfused  Skin: warm and dry without rash  Neurologic: GCS 15,  Psych: Normal Affect      ------------------------------ ED COURSE/MEDICAL DECISION MAKING----------------------  Medications   potassium chloride (KLOR-CON M) extended release tablet 40 mEq (0 mEq Oral Held 4/8/22 2316)   ondansetron (ZOFRAN-ODT) disintegrating tablet 4 mg (4 mg Oral Given 4/8/22 2059)   0.9 % sodium chloride bolus (0 mLs IntraVENous Stopped 4/9/22 0021)   ondansetron (ZOFRAN) injection 4 mg (4 mg IntraVENous Given 4/8/22 2316)   dicyclomine (BENTYL) injection 20 mg (20 mg IntraMUSCular Given 4/8/22 2338)   iopamidol (ISOVUE-370) 76 % injection 90 mL (90 mLs IntraVENous Given 4/9/22 0037)   0.9 % sodium chloride bolus (0 mLs IntraVENous Stopped 4/9/22 0353)         ED COURSE:       Medical Decision Making: Plan will be for labs will also obtain imaging and medicate for symptom relief. Labs resulted, CBC completely negative, chemistry panel with slightly low potassium 3.4. Will replete, lipase negative, urinalysis negative for any infectious component, lactic acid level negative, strep test negative. Patient did receive IV fluids as well as IV Zofran. She reports still feeling nauseous, no episodes of emesis but does report still having 2 more episodes of diarrhea. CT abdomen pelvis resulted showing rectal wall thickening which may suggest infectious or inflammatory process. The colon proximal to this point is mildly distended and fluid-filled. The possibility of large bowel obstruction cannot be excluded. No evidence of any free air or perirectal fluid collections noted. Follicular changes within the adnexa.   Will consult general surgery, less likely bowel obstruction secondary patient having diarrhea/stools. Patient made aware results and awaiting general surgery evaluation. Counseling: The emergency provider has spoken with the patient and discussed todays results, in addition to providing specific details for the plan of care and counseling regarding the diagnosis and prognosis. Questions are answered at this time and they are agreeable with the plan.      --------------------------------- IMPRESSION AND DISPOSITION ---------------------------------    IMPRESSION  1. Nausea vomiting and diarrhea    2. Hypokalemia    3. Abdominal pain, unspecified abdominal location        DISPOSITION  Disposition: Surgery to evaluate  Patient condition is good      NOTE: This report was transcribed using voice recognition software. Every effort was made to ensure accuracy; however, inadvertent computerized transcription errors may be present     SONIA Veliz - CNP  04/09/22 0208    ATTENDING PROVIDER ATTESTATION:     I have personally performed and/or participated in the history, exam, medical decision making, and procedures and agree with all pertinent clinical information. I have also reviewed and agree with the past medical, family and social history unless otherwise noted. My findings/Plan: Patient presenting because of nausea vomiting diarrhea for last 4 days. Patient reporting lower abdominal pain. Patient reporting no fever no chills no chest pain. Patient reporting no black or tarry stools or hematemesis. Patient is awake alert and x3 heart lung exam normal abdomen soft she has tenderness mainly to her lower abdomen. There is no guarding. Patient neurologically intact. Labs noted reviewed CT also noted reviewed. Patient medicated here still symptomatic CT shows concern for possible large bowel obstruction. There is also noted inflammation. General surgery was consulted for evaluation.   Patient made aware of findings and plan. Patient was eval by surgical staff as well as attending. Patient has no surgical issues. Patient to be hydrated and placed on antibiotics. Patient to follow-up PCP she is to return if symptoms worsen or persist I did reevaluate patient will after general surgery patient okay with being discharged home patient made aware of findings and plan and having no nausea. Patient does still report some mild lower abdominal pain. Patient able to tolerate liquids here in the emergency department.          Briscoe Olszewski, MD  04/09/22 Guillermo Hicks MD  04/09/22 [id number]

## 2022-06-04 ENCOUNTER — HOSPITAL ENCOUNTER (EMERGENCY)
Age: 22
Discharge: HOME | End: 2022-06-04
Payer: MEDICAID

## 2022-06-04 VITALS
SYSTOLIC BLOOD PRESSURE: 114 MMHG | TEMPERATURE: 98.8 F | HEIGHT: 66 IN | BODY MASS INDEX: 30.53 KG/M2 | HEART RATE: 96 BPM | WEIGHT: 190 LBS | OXYGEN SATURATION: 96 % | DIASTOLIC BLOOD PRESSURE: 78 MMHG | RESPIRATION RATE: 16 BRPM

## 2022-06-04 DIAGNOSIS — J02.0 STREP PHARYNGITIS: Primary | ICD-10-CM

## 2022-06-04 LAB — STREP GRP A PCR: POSITIVE

## 2022-06-04 PROCEDURE — 87880 STREP A ASSAY W/OPTIC: CPT

## 2022-06-04 PROCEDURE — 6370000000 HC RX 637 (ALT 250 FOR IP): Performed by: PHYSICIAN ASSISTANT

## 2022-06-04 PROCEDURE — 99283 EMERGENCY DEPT VISIT LOW MDM: CPT

## 2022-06-04 PROCEDURE — 6360000002 HC RX W HCPCS: Performed by: PHYSICIAN ASSISTANT

## 2022-06-04 RX ORDER — AMOXICILLIN 250 MG/1
500 CAPSULE ORAL ONCE
Status: COMPLETED | OUTPATIENT
Start: 2022-06-04 | End: 2022-06-04

## 2022-06-04 RX ORDER — ACETAMINOPHEN 500 MG
1000 TABLET ORAL ONCE
Status: COMPLETED | OUTPATIENT
Start: 2022-06-04 | End: 2022-06-04

## 2022-06-04 RX ORDER — PREDNISONE 20 MG/1
20 TABLET ORAL 2 TIMES DAILY
Qty: 10 TABLET | Refills: 0 | Status: SHIPPED | OUTPATIENT
Start: 2022-06-04 | End: 2022-06-09

## 2022-06-04 RX ORDER — ONDANSETRON 4 MG/1
4 TABLET, ORALLY DISINTEGRATING ORAL ONCE
Status: COMPLETED | OUTPATIENT
Start: 2022-06-04 | End: 2022-06-04

## 2022-06-04 RX ORDER — DEXAMETHASONE SODIUM PHOSPHATE 10 MG/ML
10 INJECTION, SOLUTION INTRAMUSCULAR; INTRAVENOUS ONCE
Status: COMPLETED | OUTPATIENT
Start: 2022-06-04 | End: 2022-06-04

## 2022-06-04 RX ORDER — AMOXICILLIN 500 MG/1
500 CAPSULE ORAL 2 TIMES DAILY
Qty: 20 CAPSULE | Refills: 0 | Status: SHIPPED | OUTPATIENT
Start: 2022-06-04 | End: 2022-06-14

## 2022-06-04 RX ADMIN — ONDANSETRON 4 MG: 4 TABLET, ORALLY DISINTEGRATING ORAL at 11:45

## 2022-06-04 RX ADMIN — ACETAMINOPHEN 1000 MG: 500 TABLET ORAL at 11:45

## 2022-06-04 RX ADMIN — AMOXICILLIN 500 MG: 250 CAPSULE ORAL at 11:45

## 2022-06-04 RX ADMIN — DEXAMETHASONE SODIUM PHOSPHATE 10 MG: 10 INJECTION, SOLUTION INTRAMUSCULAR; INTRAVENOUS at 11:45

## 2022-06-04 ASSESSMENT — PAIN SCALES - GENERAL: PAINLEVEL_OUTOF10: 7

## 2022-06-04 ASSESSMENT — PAIN DESCRIPTION - LOCATION: LOCATION: THROAT

## 2022-06-04 ASSESSMENT — PAIN DESCRIPTION - ORIENTATION: ORIENTATION: MID

## 2022-06-04 ASSESSMENT — PAIN DESCRIPTION - DESCRIPTORS: DESCRIPTORS: BURNING

## 2022-06-04 NOTE — Clinical Note
Lolita Palacios was seen and treated in our emergency department on 6/4/2022. She may return to work on 06/07/2022. If you have any questions or concerns, please don't hesitate to call.       Julian Carranza PA-C

## 2022-06-04 NOTE — ED PROVIDER NOTES
[address]  Department of Emergency Medicine   ED  Encounter Note  Admit Date/RoomTime: 2022 11:22 AM  ED Room: [facility]      NAME: Chrissy Walker  : 2000  MRN: [id number]     Chief Complaint:  Pharyngitis (since yesterday, off and on for approx. a year)    History of Present Illness      Chrissy Walker is a 25 y.o. old female who presents to the emergency department by private vehicle, for sudden onset of bilateral sore throat pain, which occured 2 day(s) prior to arrival. Associated Signs & Symptoms: bilateral ear pain, fever and sore throat Since onset the symptoms have been persistent. Nothing seems to make it better or worse. She is drinking plenty of fluids. There has been no abdominal pain, decreased appetite, chest tightness, conjunctivitis, watery eyes, dry cough, productive cough, nausea, vomiting, diarrhea, dizziness, dysuria, urinary frequency, ear pulling, headache, irritability, joint swelling, malaise, muscle aches, neck stiffness, wheezing, loss of taste or loss of smell. Patient states she has a history of strep throat and has gotten it multiple times and does not know why she keeps getting it. Exposed To: Streptococcus: unknown. Infectious Mononucleosis:  unknown. Symptoms:  Pain:  Yes. Muffled Voice:  No.                            Hoarse:  No.                            Difficulty with Secretions:  No.    Centor Score (MODIFIED) For Strep Pharyngitis:    Age 3-14 Years   no (0)     Age >44 Years   NO     Exudate or Swelling on Tonsils   yes (1)     Tender/Swollen Anterior Cervical Nodes   yes (1)     Fever? (T > 38°C, 100.4°F)   no (0)     Absence of Cough   yes (1)   TOTAL POINTS   3   Score of Zero = Probability of Strep Pharyngitis: 1% - 2.5%. ,   No further testing nor antibiotics. Score of 1 = Probability of Strep Pharyngitis: 5% - 10%. ,   No further testing nor antibiotics. Score of 2 = Probability of Strep Phar: 11% - 17%. Culture/test all, ATB's only for positive culture results. Score of 3 = Probability of Strep Phar: 28% - 35%. Culture/test all, ATB's only for positive culture results  Score of 4 or 5 = Probability of Strep Pharyngitis: 51% - 53%. Treat empirically with antibiotics. ROS   Pertinent positives and negatives are stated within HPI, all other systems reviewed and are negative. Past Medical History:  has a past medical history of Moderate episode of recurrent major depressive disorder ([facility] [address] Surgical History:  has no past surgical history on file. Social History:  reports that she is a non-smoker but has been exposed to tobacco smoke. She has never used smokeless tobacco. She reports that she does not drink alcohol and does not use drugs. Family History: family history includes Kidney Disease in her maternal grandfather; Other in her maternal grandmother. Allergies: Patient has no known allergies. Physical Exam   Oxygen Saturation Interpretation: Normal.        ED Triage Vitals [06/04/22 1048]   BP Temp Temp src Heart Rate Resp SpO2 Height Weight   -- -- -- 98 -- 98 % -- --         Constitutional:  Alert, development consistent with age. .  Ears:  abnormal external canal right ear - erythematous, abnormal external canal left ear - erythematous  Throat: Airway Patent. moderate erythema, tonsillar hypertrophy, 1+, exudates present and throat culture taken. Neck/Lymphatic:  Supple. There is Bilateral  anterior cervical node tenderness. respiratory:  Clear to auscultation and breath sounds equal.    CV: Regular rate and rhythm, normal heart sounds, without pathological murmurs, ectopy, gallops, or rubs. GI:  Abdomen Soft, nontender, good bowel sounds. No firm or pulsatile mass. Inegument:  No rashes, erythema present. Neurological:  Oriented. Motor functions intact.     Lab / Imaging Results   (All laboratory and radiology results have been personally reviewed by myself)  Labs:  Results for orders placed or performed during the hospital encounter of 06/04/22   Strep Screen Group A Throat    Specimen: Throat   Result Value Ref Range    Strep Grp A PCR POSITIVE Negative     Imaging: All Radiology results interpreted by Radiologist unless otherwise noted. No orders to display       ED Course / Medical Decision Making     Medications   dexamethasone (PF) (DECADRON) injection 10 mg (10 mg Oral Given 6/4/22 1145)   acetaminophen (TYLENOL) tablet 1,000 mg (1,000 mg Oral Given 6/4/22 1145)   amoxicillin (AMOXIL) capsule 500 mg (500 mg Oral Given 6/4/22 1145)   ondansetron (ZOFRAN-ODT) disintegrating tablet 4 mg (4 mg Oral Given 6/4/22 1145)        Consult(s):   None    Procedure(s):   none    MDM:   Based on high suspicion for Strep as per history/physical findings, Rapid Strep/Throat Culture testing was done and confirms the above findings  Pharyngitis is confirmed to be Strep. Antibiotics are indicated at this time based on clinical presentation and physical findings. Not hypoxic, nothing to suggest pneumonia. Patient is well appearing, non toxic and appropriate for outpatient management. Plan of Care: Normal progression of disease discussed. All questions answered. Instruction provided in the use of fluids, vaporizer, acetaminophen, and other OTC medication for symptom control. Extra fluids  Analgesics as needed, dosages and times reviewed. Follow up as needed should symptoms fail to improve. Patient was explicitly instructed on specific signs and symptoms on which to return to the emergency room for. Patient was instructed to return to the ER for any new or worsening symptoms. Additional discharge instructions were given verbally. All questions were answered. Patient is comfortable and agreeable with discharge plan. Patient in no acute distress and non-toxic in appearance. Assessment     1.  Strep pharyngitis Plan   Discharged home. Patient condition is stable    New Medications     New Prescriptions    AMOXICILLIN (AMOXIL) 500 MG CAPSULE    Take 1 capsule by mouth 2 times daily for 10 days    PREDNISONE (DELTASONE) 20 MG TABLET    Take 1 tablet by mouth 2 times daily for 5 days     Electronically signed by Luis A Douglas PA-C   DD: 6/4/22  **This report was transcribed using voice recognition software. Every effort was made to ensure accuracy; however, inadvertent computerized transcription errors may be present.   END OF ED PROVIDER NOTE        Luis A Douglas PA-C  06/04/22 [id number]

## 2022-07-19 ENCOUNTER — HOSPITAL ENCOUNTER (INPATIENT)
Age: 22
LOS: 5 days | Discharge: HOME | DRG: 753 | End: 2022-07-25
Attending: EMERGENCY MEDICINE | Admitting: PSYCHIATRY & NEUROLOGY
Payer: MEDICAID

## 2022-07-19 ENCOUNTER — OFFICE VISIT (OUTPATIENT)
Dept: FAMILY MEDICINE CLINIC | Age: 22
DRG: 753 | End: 2022-07-19
Payer: MEDICAID

## 2022-07-19 VITALS
HEIGHT: 66 IN | HEART RATE: 92 BPM | WEIGHT: 197 LBS | SYSTOLIC BLOOD PRESSURE: 135 MMHG | BODY MASS INDEX: 31.66 KG/M2 | TEMPERATURE: 98.2 F | DIASTOLIC BLOOD PRESSURE: 87 MMHG | RESPIRATION RATE: 95 BRPM

## 2022-07-19 DIAGNOSIS — R45.851 SUICIDAL IDEATION: Primary | ICD-10-CM

## 2022-07-19 DIAGNOSIS — F41.9 ANXIETY: Primary | ICD-10-CM

## 2022-07-19 DIAGNOSIS — Z76.89 ENCOUNTER FOR PSYCHIATRIC ASSESSMENT: ICD-10-CM

## 2022-07-19 LAB
ACETAMINOPHEN LEVEL: <5 MCG/ML (ref 10–30)
ALBUMIN SERPL-MCNC: 4.4 G/DL (ref 3.5–5.2)
ALP BLD-CCNC: 63 U/L (ref 35–104)
ALT SERPL-CCNC: 9 U/L (ref 0–32)
AMPHETAMINE SCREEN, URINE: NOT DETECTED
ANION GAP SERPL CALCULATED.3IONS-SCNC: 14 MMOL/L (ref 7–16)
AST SERPL-CCNC: 14 U/L (ref 0–31)
BACTERIA: ABNORMAL /HPF
BARBITURATE SCREEN URINE: NOT DETECTED
BASOPHILS ABSOLUTE: 0.02 E9/L (ref 0–0.2)
BASOPHILS RELATIVE PERCENT: 0.3 % (ref 0–2)
BENZODIAZEPINE SCREEN, URINE: NOT DETECTED
BILIRUB SERPL-MCNC: 0.4 MG/DL (ref 0–1.2)
BILIRUBIN URINE: NEGATIVE
BLOOD, URINE: NEGATIVE
BUN BLDV-MCNC: 6 MG/DL (ref 6–20)
CALCIUM SERPL-MCNC: 9.1 MG/DL (ref 8.6–10.2)
CANNABINOID SCREEN URINE: NOT DETECTED
CHLORIDE BLD-SCNC: 105 MMOL/L (ref 98–107)
CLARITY: ABNORMAL
CO2: 18 MMOL/L (ref 22–29)
COCAINE METABOLITE SCREEN URINE: NOT DETECTED
COLOR: YELLOW
CREAT SERPL-MCNC: 0.7 MG/DL (ref 0.5–1)
EKG ATRIAL RATE: 89 BPM
EKG P AXIS: 62 DEGREES
EKG P-R INTERVAL: 146 MS
EKG Q-T INTERVAL: 370 MS
EKG QRS DURATION: 74 MS
EKG QTC CALCULATION (BAZETT): 450 MS
EKG R AXIS: 36 DEGREES
EKG T AXIS: 31 DEGREES
EKG VENTRICULAR RATE: 89 BPM
EOSINOPHILS ABSOLUTE: 0.07 E9/L (ref 0.05–0.5)
EOSINOPHILS RELATIVE PERCENT: 1.1 % (ref 0–6)
EPITHELIAL CELLS, UA: ABNORMAL /HPF
ETHANOL: <10 MG/DL (ref 0–0.08)
FENTANYL SCREEN, URINE: NOT DETECTED
GFR AFRICAN AMERICAN: >60
GFR NON-AFRICAN AMERICAN: >60 ML/MIN/1.73
GLUCOSE BLD-MCNC: 89 MG/DL (ref 74–99)
GLUCOSE URINE: NEGATIVE MG/DL
HCG, URINE, POC: NEGATIVE
HCT VFR BLD CALC: 37.8 % (ref 34–48)
HEMOGLOBIN: 12.8 G/DL (ref 11.5–15.5)
IMMATURE GRANULOCYTES #: 0.01 E9/L
IMMATURE GRANULOCYTES %: 0.2 % (ref 0–5)
INFLUENZA A: NOT DETECTED
INFLUENZA B: NOT DETECTED
KETONES, URINE: ABNORMAL MG/DL
LEUKOCYTE ESTERASE, URINE: ABNORMAL
LYMPHOCYTES ABSOLUTE: 1.82 E9/L (ref 1.5–4)
LYMPHOCYTES RELATIVE PERCENT: 28.1 % (ref 20–42)
Lab: NORMAL
Lab: NORMAL
MCH RBC QN AUTO: 28.2 PG (ref 26–35)
MCHC RBC AUTO-ENTMCNC: 33.9 % (ref 32–34.5)
MCV RBC AUTO: 83.3 FL (ref 80–99.9)
METHADONE SCREEN, URINE: NOT DETECTED
MONOCYTES ABSOLUTE: 0.48 E9/L (ref 0.1–0.95)
MONOCYTES RELATIVE PERCENT: 7.4 % (ref 2–12)
NEGATIVE QC PASS/FAIL: NORMAL
NEUTROPHILS ABSOLUTE: 4.08 E9/L (ref 1.8–7.3)
NEUTROPHILS RELATIVE PERCENT: 62.9 % (ref 43–80)
NITRITE, URINE: NEGATIVE
OPIATE SCREEN URINE: NOT DETECTED
OXYCODONE URINE: NOT DETECTED
PDW BLD-RTO: 12.6 FL (ref 11.5–15)
PH UA: 6 (ref 5–9)
PHENCYCLIDINE SCREEN URINE: NOT DETECTED
PLATELET # BLD: 239 E9/L (ref 130–450)
PMV BLD AUTO: 9.7 FL (ref 7–12)
POSITIVE QC PASS/FAIL: NORMAL
POTASSIUM SERPL-SCNC: 3.5 MMOL/L (ref 3.5–5)
PROTEIN UA: NEGATIVE MG/DL
RBC # BLD: 4.54 E12/L (ref 3.5–5.5)
RBC UA: ABNORMAL /HPF (ref 0–2)
SALICYLATE, SERUM: <0.3 MG/DL (ref 0–30)
SARS-COV-2 RNA, RT PCR: NOT DETECTED
SODIUM BLD-SCNC: 137 MMOL/L (ref 132–146)
SPECIFIC GRAVITY UA: 1.02 (ref 1–1.03)
TOTAL PROTEIN: 7.6 G/DL (ref 6.4–8.3)
TRICYCLIC ANTIDEPRESSANTS SCREEN SERUM: NEGATIVE NG/ML
UROBILINOGEN, URINE: 0.2 E.U./DL
WBC # BLD: 6.5 E9/L (ref 4.5–11.5)
WBC UA: ABNORMAL /HPF (ref 0–5)

## 2022-07-19 PROCEDURE — 80053 COMPREHEN METABOLIC PANEL: CPT

## 2022-07-19 PROCEDURE — 87636 SARSCOV2 & INF A&B AMP PRB: CPT

## 2022-07-19 PROCEDURE — 80143 DRUG ASSAY ACETAMINOPHEN: CPT

## 2022-07-19 PROCEDURE — 99285 EMERGENCY DEPT VISIT HI MDM: CPT

## 2022-07-19 PROCEDURE — 93005 ELECTROCARDIOGRAM TRACING: CPT | Performed by: PHYSICIAN ASSISTANT

## 2022-07-19 PROCEDURE — 85025 COMPLETE CBC W/AUTO DIFF WBC: CPT

## 2022-07-19 PROCEDURE — 99212 OFFICE O/P EST SF 10 MIN: CPT | Performed by: FAMILY MEDICINE

## 2022-07-19 PROCEDURE — 81001 URINALYSIS AUTO W/SCOPE: CPT

## 2022-07-19 PROCEDURE — 82077 ASSAY SPEC XCP UR&BREATH IA: CPT

## 2022-07-19 PROCEDURE — 80307 DRUG TEST PRSMV CHEM ANLYZR: CPT

## 2022-07-19 PROCEDURE — 80179 DRUG ASSAY SALICYLATE: CPT

## 2022-07-19 PROCEDURE — 99214 OFFICE O/P EST MOD 30 MIN: CPT | Performed by: FAMILY MEDICINE

## 2022-07-19 SDOH — ECONOMIC STABILITY: TRANSPORTATION INSECURITY
IN THE PAST 12 MONTHS, HAS LACK OF TRANSPORTATION KEPT YOU FROM MEETINGS, WORK, OR FROM GETTING THINGS NEEDED FOR DAILY LIVING?: NO

## 2022-07-19 SDOH — ECONOMIC STABILITY: FOOD INSECURITY: WITHIN THE PAST 12 MONTHS, THE FOOD YOU BOUGHT JUST DIDN'T LAST AND YOU DIDN'T HAVE MONEY TO GET MORE.: OFTEN TRUE

## 2022-07-19 SDOH — ECONOMIC STABILITY: FOOD INSECURITY: WITHIN THE PAST 12 MONTHS, YOU WORRIED THAT YOUR FOOD WOULD RUN OUT BEFORE YOU GOT MONEY TO BUY MORE.: OFTEN TRUE

## 2022-07-19 SDOH — ECONOMIC STABILITY: TRANSPORTATION INSECURITY
IN THE PAST 12 MONTHS, HAS THE LACK OF TRANSPORTATION KEPT YOU FROM MEDICAL APPOINTMENTS OR FROM GETTING MEDICATIONS?: NO

## 2022-07-19 ASSESSMENT — PATIENT HEALTH QUESTIONNAIRE - PHQ9
6. FEELING BAD ABOUT YOURSELF - OR THAT YOU ARE A FAILURE OR HAVE LET YOURSELF OR YOUR FAMILY DOWN: 3
SUM OF ALL RESPONSES TO PHQ9 QUESTIONS 1 & 2: 6
SUM OF ALL RESPONSES TO PHQ QUESTIONS 1-9: 18
10. IF YOU CHECKED OFF ANY PROBLEMS, HOW DIFFICULT HAVE THESE PROBLEMS MADE IT FOR YOU TO DO YOUR WORK, TAKE CARE OF THINGS AT HOME, OR GET ALONG WITH OTHER PEOPLE: 3
SUM OF ALL RESPONSES TO PHQ QUESTIONS 1-9: 18
1. LITTLE INTEREST OR PLEASURE IN DOING THINGS: 3
SUM OF ALL RESPONSES TO PHQ QUESTIONS 1-9: 18
4. FEELING TIRED OR HAVING LITTLE ENERGY: 2
2. FEELING DOWN, DEPRESSED OR HOPELESS: 3
3. TROUBLE FALLING OR STAYING ASLEEP: 2
5. POOR APPETITE OR OVEREATING: 3
SUM OF ALL RESPONSES TO PHQ QUESTIONS 1-9: 17
8. MOVING OR SPEAKING SO SLOWLY THAT OTHER PEOPLE COULD HAVE NOTICED. OR THE OPPOSITE, BEING SO FIGETY OR RESTLESS THAT YOU HAVE BEEN MOVING AROUND A LOT MORE THAN USUAL: 0
9. THOUGHTS THAT YOU WOULD BE BETTER OFF DEAD, OR OF HURTING YOURSELF: 1
7. TROUBLE CONCENTRATING ON THINGS, SUCH AS READING THE NEWSPAPER OR WATCHING TELEVISION: 1

## 2022-07-19 ASSESSMENT — LIFESTYLE VARIABLES: HOW OFTEN DO YOU HAVE A DRINK CONTAINING ALCOHOL: NEVER

## 2022-07-19 ASSESSMENT — COLUMBIA-SUICIDE SEVERITY RATING SCALE - C-SSRS
7. DID THIS OCCUR IN THE LAST THREE MONTHS: YES
4. HAVE YOU HAD THESE THOUGHTS AND HAD SOME INTENTION OF ACTING ON THEM?: YES
6. HAVE YOU EVER DONE ANYTHING, STARTED TO DO ANYTHING, OR PREPARED TO DO ANYTHING TO END YOUR LIFE?: YES
5. HAVE YOU STARTED TO WORK OUT OR WORKED OUT THE DETAILS OF HOW TO KILL YOURSELF? DO YOU INTEND TO CARRY OUT THIS PLAN?: YES
2. HAVE YOU ACTUALLY HAD ANY THOUGHTS OF KILLING YOURSELF?: YES
1. WITHIN THE PAST MONTH, HAVE YOU WISHED YOU WERE DEAD OR WISHED YOU COULD GO TO SLEEP AND NOT WAKE UP?: YES
3. HAVE YOU BEEN THINKING ABOUT HOW YOU MIGHT KILL YOURSELF?: YES

## 2022-07-19 ASSESSMENT — SOCIAL DETERMINANTS OF HEALTH (SDOH): HOW HARD IS IT FOR YOU TO PAY FOR THE VERY BASICS LIKE FOOD, HOUSING, MEDICAL CARE, AND HEATING?: NOT HARD AT ALL

## 2022-07-19 NOTE — PROGRESS NOTES
[address]  Precepting Note    Subjective:  Depression f/u   SI with active plan    ROS otherwise negative     Past medical, surgical, family and social history were reviewed, non-contributory, and unchanged unless otherwise stated. Objective:    /87 (Site: Right Upper Arm, Position: Sitting, Cuff Size: Medium Adult)   Pulse 92   Temp 98.2 °F (36.8 °C) (Temporal)   Resp (!) 95   Ht 5' 6\" (1.676 m)   Wt 197 lb (89.4 kg)   HC 18 cm (7.09\")   BMI 31.80 kg/m²     Exam is as noted by resident with the following changes, additions or corrections:      Assessment/Plan:  Depression with suicidal ideation- referred to ED for Psych evaluation and admission     Attending Physician Statement  I have reviewed the chart, including any radiology or labs. I have discussed the case, including pertinent history and exam findings with the resident. I agree with the assessment, plan and orders as documented by the resident. Please refer to the resident note for additional information.       Electronically signed by Shadi Ling MD on 7/19/2022 at 12:24 PM

## 2022-07-19 NOTE — ED NOTES
Spoke with ED MD regarding patient with suicidal thoughts of overdosing without plan to act on those thoughts while she is here. One to one discontinued and 15 minutes checks ordered.       Renaldo Mcmahon RN  07/19/22 [id number]

## 2022-07-19 NOTE — ED NOTES
Pt allowed mom to visit. Mom became upset with [facility], RN about wanting information and that just because \"we made a mistake and called her we don't need to treat her like she's slow. \" Mom then stated Tatiana Nunez is my real life daughter and if you treat her like that I'm going to mess you up\". Spoke with [facility] SHERIE MCCONNELL and are not allowing mom to visit at this time due to voicing physical threat. Collateral: pt's chart had her parents listed as guardians from when she was a minor. Registration didn't speak with patient and just called mom to get consent to treat. This writer verified with pt and probate court online documents that she is her own person and no guardians. Pt mom became upset that she wasn't getting information. Pt also told this writer she didn't want information released to mom and she would relay information to mom that she wants her to know. Pt was offered a JORGE for her mom and pt declined.      Zora Lopes  07/19/22 4716

## 2022-07-19 NOTE — PROGRESS NOTES
[address] [facility]  Department of Family Medicine  Family Medicine Residency Program      Patient:  Marivel Palacios 23 y.o. female     Date of Service: 22      Chief complaint:   Chief Complaint   Patient presents with    Anxiety    Depression         History of Present Illness   The patient is a 23 y.o. female with a PMH of anxiety and depression who presents to the clinic for the following medical concerns:    Anxiety and depression: Patient has been lost to follow-up for a few months despite multiple attempts. Patient comes in today to discuss worsening depression. She feels like this is taking for her daily life and she has daily thoughts of wanting to die. Patient states that she did, will plan to take all of her pills at home at 1 time and had set a date to accomplish this task. She does have a sister who  from suicide some years back and had decided it was best to seek care. Patient is amenable to inpatient admission for further psychiatric care. Past Medical History:      Diagnosis Date    Moderate episode of recurrent major depressive disorder ([facility] [address] 2021       Past Surgical History:    No past surgical history on file. Allergies:    Patient has no known allergies. Social History:   Social History     Tobacco Use    Smoking status: Never     Passive exposure: Yes    Smokeless tobacco: Never   Substance Use Topics    Alcohol use: No        Family History:       Problem Relation Age of Onset    Other Maternal Grandmother         enlarged thyroid    Kidney Disease Maternal Grandfather        Reviewof Systems:   Review of Systems Negative unless otherwise stated in HPI.     Physical Exam   Vitals: /87 (Site: Right Upper Arm, Position: Sitting, Cuff Size: Medium Adult)   Pulse 92   Temp 98.2 °F (36.8 °C) (Temporal)   Resp (!) 95   Ht 5' 6\" (1.676 m)   Wt 197 lb (89.4 kg)   HC 18 cm (7.09\")   BMI 31.80 kg/m²        Physical Exam  Psychiatric:      Comments: Mood is tearful and avoidant. Poor eye content. Poor judgement and fair insight. Assessment and Plan       Severe episode of recurrent major depressive disorder ([facility] [address]  Uncontrolled  Due to SI with plan, security was called for escorting patient to ER  Patient amenable to admission      Return to Office: after discharge      Medication List:    No current outpatient medications on file. No current facility-administered medications for this visit.         Yousuf Erazo MD

## 2022-07-19 NOTE — ED NOTES
Patient in room with plastic fork attempting to harm herself. Patient gave fork to nursing tech when asked.       Arjun Carvalho, RN  07/19/22 1932

## 2022-07-19 NOTE — ED PROVIDER NOTES
ED Attending shared visit  CC: Marjorie Lara 476  Department of Emergency Medicine   ED  Encounter Note  Admit Date/RoomTime: 2022 12:54 PM  ED Room: 0549/3673-F    NAME: Lissy Alfred  : 2000  MRN: [id number]     Chief Complaint:  Suicidal (pink slipped by family [facility] + SI with a plan )    History of Present Illness       Lissy Alfred is a 23 y.o. old female who presents to the emergency department by private vehicle, for suicidal ideations. Patient apparently told family members that she was feeling suicidal, she stated to them that she was going to take pills in an attempt to end her life. She did confirm this to me. She went to her family health physician today and they pink slipped her and sent her to the emergency room. She denies any drug or alcohol abuse. She denies any hallucinations. She denies any chance of pregnancy. Patient denies any history of psychiatric problems in the past and she is not currently on any medications. She did not do anything to try to end her life today. ROS   Pertinent positives and negatives are stated within HPI, all other systems reviewed and are negative. Past Medical History:  has a past medical history of Moderate episode of recurrent major depressive disorder ([facility] [address] Surgical History:  has no past surgical history on file. Social History:  reports that she has never smoked. She has been exposed to tobacco smoke. She has never used smokeless tobacco. She reports that she does not drink alcohol and does not use drugs. Family History: family history includes Kidney Disease in her maternal grandfather; Other in her maternal grandmother. Allergies: Patient has no known allergies.     Physical Exam   Oxygen Saturation Interpretation: Normal.        ED Triage Vitals   BP Temp Temp src Heart Rate Resp SpO2 Height Weight   22 1250 22 1247 -- 22 1247 22 1250 22 1247 -- --   129/78 99.7 °F (37.6 °C)  93 18 98 %           General Appearance:  well-appearing. Constitutional:   Level of Consciousness: Awake and alert. ETOH: no.          Distress: none. Cooperativeness: cooperative. Eyes:  PERRL, EOMI, no discharge or conjunctival injection. Ears:  External ears without lesions. Throat:  Pharynx without injection, exudate, or tonsillar hypertrophy. Airway patient. Neck:  Normal ROM. Supple. Respiratory:  Clear to auscultation and breath sounds equal.  CV:  Regular rate and rhythm, normal heart sounds, without pathological murmurs, ectopy, gallops, or rubs. GI:  Abdomen Soft, nontender, good bowel sounds. No firm or pulsatile mass. Back:  No costovertebral tenderness. Integument:  Normal turgor. Warm, dry, without visible rash, unless noted elsewhere. Lymphatics: No lymphangitis or adenopathy noted. Neurological:  Oriented. Motor functions intact. Psychiatric:        Thought Process:       Coherent:  yes. Delusions / Paranoia: no evidence of paranoia. Flight of ideas:  no.         Rambling conversation:  no. Affect: flat. Suicidal ideation:  suicidal ideation with clear plan and intent. Homicidal ideation:  no homicidal ideation. Perceptions:  denies any perceptual disturbance present. Insight: below average. Judgement: below average.     Lab / Imaging Results   (All laboratory and radiology results have been personally reviewed by myself)  Labs:  Results for orders placed or performed during the hospital encounter of 07/19/22   COVID-19 & Influenza Combo    Specimen: Nasopharyngeal Swab   Result Value Ref Range    SARS-CoV-2 RNA, RT PCR NOT DETECTED NOT DETECTED    INFLUENZA A NOT DETECTED NOT DETECTED    INFLUENZA B NOT DETECTED NOT DETECTED   Urinalysis with Microscopic   Result Value Ref Range    Color, UA Yellow Straw/Yellow    Clarity, UA SL CLOUDY Clear    Glucose, Ur Negative Negative mg/dL    Bilirubin Urine Negative Negative    Ketones, Urine TRACE (A) Negative mg/dL    Specific Gravity, UA 1.025 1.005 - 1.030    Blood, Urine Negative Negative    pH, UA 6.0 5.0 - 9.0    Protein, UA Negative Negative mg/dL    Urobilinogen, Urine 0.2 <2.0 E.U./dL    Nitrite, Urine Negative Negative    Leukocyte Esterase, Urine SMALL (A) Negative    WBC, UA 1-3 0 - 5 /HPF    RBC, UA NONE 0 - 2 /HPF    Epithelial Cells, UA FEW /HPF    Bacteria, UA MANY (A) None Seen /HPF   CBC with Auto Differential   Result Value Ref Range    WBC 6.5 4.5 - 11.5 E9/L    RBC 4.54 3.50 - 5.50 E12/L    Hemoglobin 12.8 11.5 - 15.5 g/dL    Hematocrit 37.8 34.0 - 48.0 %    MCV 83.3 80.0 - 99.9 fL    MCH 28.2 26.0 - 35.0 pg    MCHC 33.9 32.0 - 34.5 %    RDW 12.6 11.5 - 15.0 fL    Platelets 747 618 - 074 E9/L    MPV 9.7 7.0 - 12.0 fL    Neutrophils % 62.9 43.0 - 80.0 %    Immature Granulocytes % 0.2 0.0 - 5.0 %    Lymphocytes % 28.1 20.0 - 42.0 %    Monocytes % 7.4 2.0 - 12.0 %    Eosinophils % 1.1 0.0 - 6.0 %    Basophils % 0.3 0.0 - 2.0 %    Neutrophils Absolute 4.08 1.80 - 7.30 E9/L    Immature Granulocytes # 0.01 E9/L    Lymphocytes Absolute 1.82 1.50 - 4.00 E9/L    Monocytes Absolute 0.48 0.10 - 0.95 E9/L    Eosinophils Absolute 0.07 0.05 - 0.50 E9/L    Basophils Absolute 0.02 0.00 - 0.20 E9/L   Comprehensive Metabolic Panel   Result Value Ref Range    Sodium 137 132 - 146 mmol/L    Potassium 3.5 3.5 - 5.0 mmol/L    Chloride 105 98 - 107 mmol/L    CO2 18 (L) 22 - 29 mmol/L    Anion Gap 14 7 - 16 mmol/L    Glucose 89 74 - 99 mg/dL    BUN 6 6 - 20 mg/dL    Creatinine 0.7 0.5 - 1.0 mg/dL    GFR Non-African American >60 >=60 mL/min/1.73    GFR African American >60     Calcium 9.1 8.6 - 10.2 mg/dL    Total Protein 7.6 6.4 - 8.3 g/dL    Albumin 4.4 3.5 - 5.2 g/dL    Total Bilirubin 0.4 0.0 - 1.2 mg/dL    Alkaline Phosphatase 63 35 - 104 U/L    ALT 9 0 - 32 U/L    AST 14 0 - 31 U/L   URINE DRUG SCREEN   Result Value Ref Range    Amphetamine Screen, Urine NOT DETECTED Negative <1000 ng/mL    Barbiturate Screen, Ur NOT DETECTED Negative < 200 ng/mL    Benzodiazepine Screen, Urine NOT DETECTED Negative < 200 ng/mL    Cannabinoid Scrn, Ur NOT DETECTED Negative < 50ng/mL    Cocaine Metabolite Screen, Urine NOT DETECTED Negative < 300 ng/mL    Opiate Scrn, Ur NOT DETECTED Negative < 300ng/mL    PCP Screen, Urine NOT DETECTED Negative < 25 ng/mL    Methadone Screen, Urine NOT DETECTED Negative <300 ng/mL    Oxycodone Urine NOT DETECTED Negative <100 ng/mL    FENTANYL SCREEN, URINE NOT DETECTED Negative <1 ng/mL    Drug Screen Comment: see below    Serum Drug Screen   Result Value Ref Range    Ethanol Lvl <10 mg/dL    Acetaminophen Level <5.0 (L) 10.0 - 35.4 mcg/mL    Salicylate, Serum <2.4 0.0 - 30.0 mg/dL    TCA Scrn NEGATIVE Cutoff:300 ng/mL   Hemoglobin A1C   Result Value Ref Range    Hemoglobin A1C 5.0 4.0 - 5.6 %   Lipid panel   Result Value Ref Range    Cholesterol, Total 187 0 - 199 mg/dL    Triglycerides 57 0 - 149 mg/dL    HDL 43 >40 mg/dL    LDL Calculated 133 (H) 0 - 99 mg/dL    VLDL Cholesterol Calculated 11 mg/dL   POC Pregnancy Urine Qual   Result Value Ref Range    HCG, Urine, POC Negative Negative    Lot Number [id number]     Positive QC Pass/Fail Pass     Negative QC Pass/Fail Pass    EKG 12 Lead   Result Value Ref Range    Ventricular Rate 89 BPM    Atrial Rate 89 BPM    P-R Interval 146 ms    QRS Duration 74 ms    Q-T Interval 370 ms    QTc Calculation (Bazett) 450 ms    P Axis 62 degrees    R Axis 36 degrees    T Axis 31 degrees     Imaging: All Radiology results interpreted by Radiologist unless otherwise noted. No orders to display     EKG #1:  Interpreted by emergency department attending physician unless otherwise noted. 7/19/22  Time: 1325    Rhythm: normal sinus   Rate: normal.  No ST segment elevation or depression. UT intervals 146 ms, QRS duration is 74 ms.     ED Course / Medical Decision Making     Medications   acetaminophen (TYLENOL) tablet 650 mg (650 mg Oral Given 7/22/22 2156)   magnesium hydroxide (MILK OF MAGNESIA) 400 MG/5ML suspension 30 mL (has no administration in time range)   nicotine (NICODERM CQ) 21 MG/24HR 1 patch (1 patch TransDERmal Not Given 7/23/22 0911)   aluminum & magnesium hydroxide-simethicone (MAALOX) 200-200-20 MG/5ML suspension 30 mL (has no administration in time range)   hydrOXYzine pamoate (VISTARIL) capsule 50 mg (50 mg Oral Given 7/21/22 2113)   haloperidol (HALDOL) tablet 5 mg (has no administration in time range)     Or   haloperidol lactate (HALDOL) injection 5 mg (has no administration in time range)   melatonin tablet 3 mg (3 mg Oral Given 7/23/22 2200)   citalopram (CELEXA) tablet 10 mg (10 mg Oral Given 7/23/22 0910)   OXcarbazepine (TRILEPTAL) tablet 300 mg (300 mg Oral Given 7/23/22 2200)            Consult(s):   IP CONSULT TO SOCIAL WORK    Procedure(s):   None    MDM:   Patient here with suicidal ideations, with a clear-cut plan. Apparently she told family members her plan was to overdose and had a specific date that she was going to do this. She is pink slipped by her PCP, patient to be admitted per psych    Plan of Care/Counseling:  JANETTE Ashton reviewed today's visit with the patient in addition to providing specific details for the plan of care and counseling regarding the diagnosis and prognosis. Questions are answered at this time and are agreeable with the plan. Assessment      1. Suicidal ideation    2. Encounter for psychiatric assessment      Plan   admit to psych. Patient condition is good    New Medications     There are no discharge medications for this patient. Electronically signed by JANETTE Ashton   DD: 7/19/22  **This report was transcribed using voice recognition software. Every effort was made to ensure accuracy; however, inadvertent computerized transcription errors may be present.   END OF ED PROVIDER NOTE        ATTENDING PROVIDER ATTESTATION:     Salina Trevizo presented to the emergency department for evaluation of Suicidal (pink slipped by [facility] + SI with a plan )    I have reviewed and discussed the case, including pertinent history (medical, surgical, family and social) and exam findings with the Midlevel and the Nurse assigned to [employer]. I have personally performed and/or participated in the history, exam, medical decision making, and procedures and agree with all pertinent clinical information. The nursing notes within the ED encounter and vital signs as below have been reviewed by myself  Vitals:    07/24/22 0600   BP: (!) 100/58   Pulse: 71   Resp: 14   Temp: 98 °F (36.7 °C)   SpO2:          Oxygen Saturation Interpretation: Normal         The patients available past medical records and past encounters were reviewed. MDM: Asked to see this patient by RADHA, required direct physician involvement secondary to complexity of case, patient arrives on psychiatric hold from her family physician. Medical work-up undertaken reassuring, she is medically clear for admission to psychiatric facility. I, Dr. Travis Varma am the primary provider of record      I have reviewed my findings and recommendations with [employer] [facility] and members of family present at the time of disposition.       --------------------------------- IMPRESSION AND DISPOSITION ---------------------------------    IMPRESSION  1. Suicidal ideation    2.  Encounter for psychiatric assessment        DISPOSITION  Disposition: Admit to mental health unit - medically cleared for admission  Patient condition is stable         Nadia Gaytan DO  07/19/22 [employer], [place]  07/24/22 [id number]

## 2022-07-20 PROBLEM — F31.81 BIPOLAR 2 DISORDER, MAJOR DEPRESSIVE EPISODE (HCC): Status: ACTIVE | Noted: 2022-07-20

## 2022-07-20 PROBLEM — F60.89 CLUSTER B PERSONALITY DISORDER (HCC): Status: ACTIVE | Noted: 2022-07-20

## 2022-07-20 PROBLEM — F39 MOOD DISORDER (HCC): Status: ACTIVE | Noted: 2022-07-20

## 2022-07-20 PROCEDURE — 6370000000 HC RX 637 (ALT 250 FOR IP): Performed by: PSYCHIATRY & NEUROLOGY

## 2022-07-20 PROCEDURE — 90792 PSYCH DIAG EVAL W/MED SRVCS: CPT | Performed by: NURSE PRACTITIONER

## 2022-07-20 PROCEDURE — 6370000000 HC RX 637 (ALT 250 FOR IP): Performed by: NURSE PRACTITIONER

## 2022-07-20 PROCEDURE — 1240000000 HC EMOTIONAL WELLNESS R&B

## 2022-07-20 RX ORDER — HYDROXYZINE PAMOATE 50 MG/1
50 CAPSULE ORAL 3 TIMES DAILY PRN
Status: DISCONTINUED | OUTPATIENT
Start: 2022-07-20 | End: 2022-07-25 | Stop reason: HOSPADM

## 2022-07-20 RX ORDER — CITALOPRAM 10 MG/1
10 TABLET ORAL DAILY
Status: DISCONTINUED | OUTPATIENT
Start: 2022-07-20 | End: 2022-07-25 | Stop reason: HOSPADM

## 2022-07-20 RX ORDER — ACETAMINOPHEN 325 MG/1
650 TABLET ORAL EVERY 6 HOURS PRN
Status: DISCONTINUED | OUTPATIENT
Start: 2022-07-20 | End: 2022-07-25 | Stop reason: HOSPADM

## 2022-07-20 RX ORDER — NICOTINE 21 MG/24HR
1 PATCH, TRANSDERMAL 24 HOURS TRANSDERMAL DAILY
Status: DISCONTINUED | OUTPATIENT
Start: 2022-07-20 | End: 2022-07-25 | Stop reason: HOSPADM

## 2022-07-20 RX ORDER — OXCARBAZEPINE 150 MG/1
150 TABLET, FILM COATED ORAL 2 TIMES DAILY
Status: DISCONTINUED | OUTPATIENT
Start: 2022-07-20 | End: 2022-07-21

## 2022-07-20 RX ORDER — LANOLIN ALCOHOL/MO/W.PET/CERES
3 CREAM (GRAM) TOPICAL NIGHTLY
Status: DISCONTINUED | OUTPATIENT
Start: 2022-07-20 | End: 2022-07-25 | Stop reason: HOSPADM

## 2022-07-20 RX ORDER — HALOPERIDOL 5 MG
5 TABLET ORAL EVERY 6 HOURS PRN
Status: DISCONTINUED | OUTPATIENT
Start: 2022-07-20 | End: 2022-07-25 | Stop reason: HOSPADM

## 2022-07-20 RX ORDER — HALOPERIDOL 5 MG/ML
5 INJECTION INTRAMUSCULAR EVERY 6 HOURS PRN
Status: DISCONTINUED | OUTPATIENT
Start: 2022-07-20 | End: 2022-07-25 | Stop reason: HOSPADM

## 2022-07-20 RX ORDER — MAGNESIUM HYDROXIDE/ALUMINUM HYDROXICE/SIMETHICONE 120; 1200; 1200 MG/30ML; MG/30ML; MG/30ML
30 SUSPENSION ORAL PRN
Status: DISCONTINUED | OUTPATIENT
Start: 2022-07-20 | End: 2022-07-25 | Stop reason: HOSPADM

## 2022-07-20 RX ADMIN — MELATONIN 3 MG ORAL TABLET 3 MG: 3 TABLET ORAL at 21:06

## 2022-07-20 RX ADMIN — HYDROXYZINE PAMOATE 50 MG: 50 CAPSULE ORAL at 01:33

## 2022-07-20 RX ADMIN — CITALOPRAM HYDROBROMIDE 10 MG: 20 TABLET ORAL at 10:17

## 2022-07-20 RX ADMIN — OXCARBAZEPINE 150 MG: 150 TABLET, FILM COATED ORAL at 10:17

## 2022-07-20 RX ADMIN — MELATONIN 3 MG ORAL TABLET 3 MG: 3 TABLET ORAL at 01:33

## 2022-07-20 RX ADMIN — OXCARBAZEPINE 150 MG: 150 TABLET, FILM COATED ORAL at 21:06

## 2022-07-20 ASSESSMENT — SLEEP AND FATIGUE QUESTIONNAIRES
AVERAGE NUMBER OF SLEEP HOURS: 3
DO YOU USE A SLEEP AID: NO
AVERAGE NUMBER OF SLEEP HOURS: 2
DO YOU USE A SLEEP AID: NO
DO YOU HAVE DIFFICULTY SLEEPING: YES
SLEEP PATTERN: DISTURBED/INTERRUPTED SLEEP;RESTLESSNESS
SLEEP PATTERN: DISTURBED/INTERRUPTED SLEEP
DO YOU HAVE DIFFICULTY SLEEPING: YES

## 2022-07-20 ASSESSMENT — PATIENT HEALTH QUESTIONNAIRE - PHQ9: SUM OF ALL RESPONSES TO PHQ QUESTIONS 1-9: 20

## 2022-07-20 ASSESSMENT — LIFESTYLE VARIABLES
HOW MANY STANDARD DRINKS CONTAINING ALCOHOL DO YOU HAVE ON A TYPICAL DAY: PATIENT DOES NOT DRINK
HOW OFTEN DO YOU HAVE A DRINK CONTAINING ALCOHOL: NEVER
HOW MANY STANDARD DRINKS CONTAINING ALCOHOL DO YOU HAVE ON A TYPICAL DAY: PATIENT DOES NOT DRINK
HOW OFTEN DO YOU HAVE A DRINK CONTAINING ALCOHOL: NEVER

## 2022-07-20 NOTE — PROGRESS NOTES
Spoke with Bin at [facility]. Amoxicillin and Prednisone filled 6/2022  Lexapro 5 mg QD #30 last filled 11/2021  Zoloft 25 mg QD #30 sent 2/20/2022 but patient never filled.
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
## 2022-07-20 NOTE — ED NOTES
Nurse to nurse report called to Jimena Michelle, questions answered.  Patient placed in transport      Yunior Morganton, [facility]  07/20/22 4844
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
## 2022-07-20 NOTE — PLAN OF CARE
Problem: Depression  Goal: Will be euthymic at discharge  Description: INTERVENTIONS:  1. Administer medication as ordered  2. Provide emotional support via 1:1 interaction with staff  3. Encourage involvement in milieu/groups/activities  4. Monitor for social isolation  Outcome: Not Progressing     Problem: Sleep Disturbance  Goal: Will exhibit normal sleeping pattern  Description: INTERVENTIONS:  1. Administer medication as ordered  2. Decrease environmental stimuli, including noise, as appropriate  3. Discourage social isolation and naps during the day  Outcome: Not Progressing   Patient was in his room,awake. Eye contact fair. Affect was blunt,flat,sad. Denies suicidal,homicidal or AV hallucinations. Verbalizes depression 7 out of 10 for unknown reason. Denies anxiety currently. Appetite is good. Verbalizes getting enough sleep. Verbalizes reason that he stays in room is do to doesn't like going out there gets anxious. Compliant with medications. Attends group. Q 15 minute rounds continue.
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
## 2022-07-20 NOTE — ED NOTES
Patient has been accepted for admission to [facility] by Dr. Margarita Mohr. Patient will go to room 7516. Called admitting and notified Zuri Pickett. DAMIÁN RN is aware to call nurse to nurse and put in for patient transport.      EYAL Olmedo, [place]  07/20/22 9438

## 2022-07-21 LAB
CHOLESTEROL, TOTAL: 187 MG/DL (ref 0–199)
HBA1C MFR BLD: 5 % (ref 4–5.6)
HDLC SERPL-MCNC: 43 MG/DL
LDL CHOLESTEROL CALCULATED: 133 MG/DL (ref 0–99)
TRIGL SERPL-MCNC: 57 MG/DL (ref 0–149)
VLDLC SERPL CALC-MCNC: 11 MG/DL

## 2022-07-21 PROCEDURE — 1240000000 HC EMOTIONAL WELLNESS R&B

## 2022-07-21 PROCEDURE — 36415 COLL VENOUS BLD VENIPUNCTURE: CPT

## 2022-07-21 PROCEDURE — 6370000000 HC RX 637 (ALT 250 FOR IP): Performed by: NURSE PRACTITIONER

## 2022-07-21 PROCEDURE — 6370000000 HC RX 637 (ALT 250 FOR IP): Performed by: PSYCHIATRY & NEUROLOGY

## 2022-07-21 PROCEDURE — 99232 SBSQ HOSP IP/OBS MODERATE 35: CPT | Performed by: NURSE PRACTITIONER

## 2022-07-21 PROCEDURE — 80061 LIPID PANEL: CPT

## 2022-07-21 PROCEDURE — 83036 HEMOGLOBIN GLYCOSYLATED A1C: CPT

## 2022-07-21 RX ORDER — OXCARBAZEPINE 300 MG/1
300 TABLET, FILM COATED ORAL 2 TIMES DAILY
Status: DISCONTINUED | OUTPATIENT
Start: 2022-07-21 | End: 2022-07-25 | Stop reason: HOSPADM

## 2022-07-21 RX ADMIN — CITALOPRAM HYDROBROMIDE 10 MG: 20 TABLET ORAL at 09:01

## 2022-07-21 RX ADMIN — OXCARBAZEPINE 300 MG: 300 TABLET, FILM COATED ORAL at 21:13

## 2022-07-21 RX ADMIN — HYDROXYZINE PAMOATE 50 MG: 50 CAPSULE ORAL at 21:13

## 2022-07-21 RX ADMIN — MELATONIN 3 MG ORAL TABLET 3 MG: 3 TABLET ORAL at 21:13

## 2022-07-21 RX ADMIN — OXCARBAZEPINE 150 MG: 150 TABLET, FILM COATED ORAL at 09:01

## 2022-07-21 ASSESSMENT — PAIN - FUNCTIONAL ASSESSMENT: PAIN_FUNCTIONAL_ASSESSMENT: NONE - DENIES PAIN

## 2022-07-21 NOTE — GROUP NOTE
Group Therapy Note    Date: 7/21/2022    Group Start Time: 1100  Group End Time: 1558  Group Topic: Psychotherapy    [facility] ACUTE BH 1    [facility]        Group Therapy Note    Attendees: 4       Patient's Goal:  To increase socialization and improve interpersonal relationships. Notes:  Pt was an active participant in group discussion. Status After Intervention:  Improved    Participation Level: Active Listener and Interactive    Participation Quality: Appropriate, Attentive, Sharing, and Supportive      Speech:  normal      Thought Process/Content: Logical  Linear      Affective Functioning: Congruent      Mood: anxious      Level of consciousness:  Alert, Oriented x4, and Attentive      Response to Learning: Able to verbalize current knowledge/experience, Able to verbalize/acknowledge new learning, Able to retain information, Capable of insight, and Progressing to goal      Endings: None Reported    Modes of Intervention: Support, Socialization, and Exploration      Discipline Responsible: /Counselor      Signature:   [facility]

## 2022-07-21 NOTE — PROGRESS NOTES
Patient tone SI,HI, or Hallucinations. Rates depression and anxiety 6/10. Presents sad, depressed and isolates to room at times. States she is lonely here, and has social anxiety. She is cooperative during assessment. Attended groups and takes her meds. Will continue to monitor.

## 2022-07-21 NOTE — CARE COORDINATION
MARTHA met with pt. Pt reports feeling okay today, denied SI/HI/AVH. Pt reports she plans to return home where she lives with her sister at time of discharge. Pt did agree to sign the JORGE for her sister Jose Francisco Briceño [phone]. After signing the JORGE for her sister pt asked if she would be discharging tomorrow. SW advised pt there is not a set discharge at this time. Pt is not active with any outpatient mental health providers and will need to be referred for services. Pt cooperative, blunt, flat, depressed, with poor eye contact, clear speech, and fair insight/judgement.

## 2022-07-21 NOTE — CARE COORDINATION
MARTHA contacted pt sister Kehinde Stout  (JORGE signed). Kehinde Stout reports they started to care for their brothers kids recently after their mom passed away. She reports this has caused pt a lot of stress. Kehinde Stout reports she has not noticed any other changes in pt recently. Kehinde Stout reports she took pt to her appointment on day of admission but she did not know why pt was admitted. Kehinde Stout reports pt will return home, she will be picking her up, and there are no guns or weapons in the home. Kehinde Stout has no general or safety concerns for pt. No other questions or concerns, offered to assist as needed.

## 2022-07-21 NOTE — PLAN OF CARE
Problem: Self Harm/Suicidality  Goal: Will have no self-injury during hospital stay  Description: INTERVENTIONS:  1. Q 30 MINUTES: Routine safety checks  2. Q SHIFT & PRN: Assess risk to determine if routine checks are adequate to maintain patient safety  Outcome: Progressing     Problem: Depression  Goal: Will be euthymic at discharge  Description: INTERVENTIONS:  1. Administer medication as ordered  2. Provide emotional support via 1:1 interaction with staff  3. Encourage involvement in milieu/groups/activities  4. Monitor for social isolation  Outcome: Progressing     Problem: Behavior  Goal: Pt/Family maintain appropriate behavior and adhere to behavioral management agreement, if implemented  Description: INTERVENTIONS:  1. Assess patient/family's coping skills and  non-compliant behavior (including use of illegal substances)  2. Notify security of behavior or suspected illegal substances which indicate the need for search of the family and/or belongings  3. Encourage verbalization of thoughts and concerns in a socially appropriate manner  4. Utilize positive, consistent limit setting strategies supporting safety of patient, staff and others  5. Encourage participation in the decision making process about the behavioral management agreement  6. If a visitor's behavior poses a threat to safety call refer to organization policy. 7. Initiate consult with , Psychosocial CNS, Spiritual Care as appropriate  Outcome: Progressing     Problem: Anxiety  Goal: Will report anxiety at manageable levels  Description: INTERVENTIONS:  1. Administer medication as ordered  2. Teach and rehearse alternative coping skills  3. Provide emotional support with 1:1 interaction with staff  Outcome: Progressing     Problem: Sleep Disturbance  Goal: Will exhibit normal sleeping pattern  Description: INTERVENTIONS:  1. Administer medication as ordered  2. Decrease environmental stimuli, including noise, as appropriate  3. Discourage social isolation and naps during the day  Outcome: Progressing     Problem: Involuntary Admit  Goal: Will cooperate with staff recommendations and doctor's orders and will demonstrate appropriate behavior  Description: INTERVENTIONS:  1. Treat underlying conditions and offer medication as ordered  2. Educate regarding involuntary admission procedures and rules  3.  Contain excessive/inappropriate behavior per unit and hospital policies  Outcome: Progressing

## 2022-07-21 NOTE — PROGRESS NOTES
BEHAVIORAL HEALTH FOLLOW-UP NOTE     7/21/2022     Patient was seen and examined in person, Chart reviewed   Patient's case discussed with staff/team    Chief Complaint: \" I am doing okay. \"    Interim History:     Patient is seen in her room with a flat blunted affect she offers very little conversation she states she is doing [place]. \"  She denies SI/HI intent or plan denies auditory or visual hallucinations she start attending groups today her affect is still flat and blunted she offers little conversation denies any side effects to medication      Appetite:   [x] Normal/Unchanged  [] Increased  [] Decreased      Sleep:       [x] Normal/Unchanged  [] Fair       [] Poor              Energy:    [x] Normal/Unchanged  [] Increased  [] Decreased        SI [] Present  [x] Absent    HI  []Present  [x] Absent     Aggression:  [] yes  [x] no    Patient is [x] able  [] unable to CONTRACT FOR SAFETY     PAST MEDICAL/PSYCHIATRIC HISTORY:   Past Medical History:   Diagnosis Date    Moderate episode of recurrent major depressive disorder ([facility] [address] 11/26/2021       FAMILY/SOCIAL HISTORY:  Family History   Problem Relation Age of Onset    Other Maternal Grandmother         enlarged thyroid    Kidney Disease Maternal Grandfather      Social History     Socioeconomic History    Marital status: Single     Spouse name: Not on file    Number of children: Not on file    Years of education: Not on file    Highest education level: Not on file   Occupational History    Not on file   Tobacco Use    Smoking status: Never     Passive exposure: Yes    Smokeless tobacco: Never   Vaping Use    Vaping Use: Never used   Substance and Sexual Activity    Alcohol use: No    Drug use: No    Sexual activity: Never   Other Topics Concern    Not on file   Social History Narrative    Not on file     Social Determinants of Health     Financial Resource Strain: Low Risk     Difficulty of Paying Living Expenses: Not hard at all   Food Insecurity: [address] Present    Worried About [address] in the Last Year: Often true    Ran Out of Food in the Last Year: Often true   Transportation Needs: No Transportation Needs    Lack of Transportation (Medical): No    Lack of Transportation (Non-Medical): No   Physical Activity: Not on file   Stress: Not on file   Social Connections: Not on file   Intimate Partner Violence: Not on file   Housing Stability: Not on file           ROS:  [x] All negative/unchanged except if checked.  Explain positive(checked items) below:  [] Constitutional  [] Eyes  [] Ear/Nose/Mouth/Throat  [] Respiratory  [] CV  [] GI  []   [] Musculoskeletal  [] Skin/Breast  [] Neurological  [] Endocrine  [] Heme/Lymph  [] Allergic/Immunologic    Explanation:     MEDICATIONS:    Current Facility-Administered Medications:     acetaminophen (TYLENOL) tablet 650 mg, 650 mg, Oral, Q6H PRN, Evie Connell MD    magnesium hydroxide (MILK OF MAGNESIA) 400 MG/5ML suspension 30 mL, 30 mL, Oral, Daily PRN, Evie Connell MD    nicotine (NICODERM CQ) 21 MG/24HR 1 patch, 1 patch, TransDERmal, Daily, Evie Connell MD    aluminum & magnesium hydroxide-simethicone (MAALOX) 200-200-20 MG/5ML suspension 30 mL, 30 mL, Oral, PRN, Evie Connell MD    hydrOXYzine pamoate (VISTARIL) capsule 50 mg, 50 mg, Oral, TID PRN, Evie Connell MD, 50 mg at 07/20/22 0133    haloperidol (HALDOL) tablet 5 mg, 5 mg, Oral, Q6H PRN **OR** haloperidol lactate (HALDOL) injection 5 mg, 5 mg, IntraMUSCular, Q6H PRN, Evie Connell MD    melatonin tablet 3 mg, 3 mg, Oral, Nightly, Evie Connell MD, 3 mg at 07/20/22 2106    citalopram (CELEXA) tablet 10 mg, 10 mg, Oral, Daily, Prerna Colon APRN - CNP, 10 mg at 07/20/22 1017    OXcarbazepine (TRILEPTAL) tablet 150 mg, 150 mg, Oral, BID, Sapna Stock APRN - CNP, 685 mg at 07/20/22 2106      Examination:  /61   Pulse 75   Temp 98.4 °F (36.9 °C) (Temporal)   Resp 18   Ht 5' 7\" (1.702 m)   Wt 197 lb (89.4 kg) LMP 06/18/2022 (Approximate)   SpO2 99%   BMI 30.85 kg/m²   Gait - steady  Medication side effects(SE):     Mental Status Examination:    Level of consciousness:  within normal limits   Appearance:  fair grooming and fair hygiene  Behavior/Motor: Poor eye contact  Attitude toward examiner:  cooperative  Speech:  spontaneous, normal rate and normal volume   Mood: \" I am okay. \"  Affect: Flat and blunted  Thought processes: Linear thought flight of ideas loose associations  Thought content: Devoid of any auditory visual hallucinations delusions or other perceptual normalities. Denies SI/HI intent or plan  Cognition:  oriented to person, place, and time   Concentration intact  Insight Limited  Judgement Limited              ASSESSMENT:   Patient symptoms are:  [] Well controlled  [] Improving  [] Worsening  [x] No change      Diagnosis:   Principal Problem:    Bipolar 2 disorder, major depressive episode ([facility] [address]  Active Problems:    Cluster B personality [facility]  Resolved Problems:    * No resolved hospital problems. *      LABS:    Recent Labs     07/19/22  1329   WBC 6.5   HGB 12.8        Recent Labs     07/19/22  1329      K 3.5      CO2 18*   BUN 6   CREATININE 0.7   GLUCOSE 89     Recent Labs     07/19/22  1329   BILITOT 0.4   ALKPHOS 63   AST 14   ALT 9     Lab Results   Component Value Date/Time    LABAMPH NOT DETECTED 07/19/2022 01:13 PM    BARBSCNU NOT DETECTED 07/19/2022 01:13 PM    LABBENZ NOT DETECTED 07/19/2022 01:13 PM    LABMETH NOT DETECTED 07/19/2022 01:13 PM    OPIATESCREENURINE NOT DETECTED 07/19/2022 01:13 PM    PHENCYCLIDINESCREENURINE NOT DETECTED 07/19/2022 01:13 PM    ETOH <10 07/19/2022 01:29 PM     No results found for: TSH, FREET4  No results found for: LITHIUM  No results found for: VALPROATE, CBMZ        Treatment Plan:  Reviewed current Medications with the patient.    Risks, benefits, side effects, drug-to-drug interactions and alternatives to treatment were discussed. Collateral information:   CD evaluation  Encourage patient to attend group and other milieu activities.   Discharge planning discussed with the patient and treatment team.    Increase Trileptal to 300 mg twice daily  Continue Celexa 10 mg daily    PSYCHOTHERAPY/COUNSELING:  [x] Therapeutic interview  [x] Supportive  [] CBT  [] Ongoing  [] Other    [x] Patient continues to need, on a daily basis, active treatment furnished directly by or requiring the supervision of inpatient psychiatric personnel      Anticipated Length of stay: 3 to 7 days based on stability            Electronically signed by SONIA Armenta CNP on 7/33/5484 at 6:23 AM Spontaneous, unlabored and symmetrical

## 2022-07-21 NOTE — PROGRESS NOTES
Attended morning community meeting. Updated on staffing and daily expectations. Shared goal for the day as to go with the flow.

## 2022-07-21 NOTE — PROGRESS NOTES
Patient only out of room for meals. Denies SI,HI or AV hallucinations. Depression 5 out of 10. Denies anxiety. Compliant with medications. Attended group. Q 15 minute rounds continue.

## 2022-07-21 NOTE — GROUP NOTE
Group Therapy Note    Date: 7/21/2022    Group Start Time: 1000  Group End Time: 2675  Group Topic: Psychoeducation    [facility] ACUTE BH 1    Mery Joseph, [address]                                                                        Group Therapy Note    Date: 7/21/2022    Wellness Binder Information  Module Name:  what I can control     Patient's Goal:  Patient will be able to id what one has 0 control over, some control and most control over. Notes:  Patient pleasant and sharing when prompted. Participates appropriately. Status After Intervention:  Improved    Participation Level:  Active Listener, Interactive, and Monopolizing    Participation Quality: Appropriate, Attentive, and Sharing      Speech:  normal      Thought Process/Content: Logical      Affective Functioning: Congruent      Mood: euthymic      Level of consciousness:  Alert, Oriented x4, and Attentive      Response to Learning: Able to verbalize/acknowledge new learning, Able to retain information, and Progressing to goal      Endings: None Reported    Modes of Intervention: Education, Support, Socialization, and Problem-solving      Discipline Responsible: Psychoeducational Specialist      Signature:  Kwaku Colon     Group Therapy Note

## 2022-07-22 PROCEDURE — 6370000000 HC RX 637 (ALT 250 FOR IP): Performed by: NURSE PRACTITIONER

## 2022-07-22 PROCEDURE — 1240000000 HC EMOTIONAL WELLNESS R&B

## 2022-07-22 PROCEDURE — 6370000000 HC RX 637 (ALT 250 FOR IP): Performed by: PSYCHIATRY & NEUROLOGY

## 2022-07-22 PROCEDURE — 99232 SBSQ HOSP IP/OBS MODERATE 35: CPT | Performed by: NURSE PRACTITIONER

## 2022-07-22 RX ADMIN — OXCARBAZEPINE 300 MG: 300 TABLET, FILM COATED ORAL at 21:55

## 2022-07-22 RX ADMIN — ACETAMINOPHEN 325MG 650 MG: 325 TABLET ORAL at 21:56

## 2022-07-22 RX ADMIN — MELATONIN 3 MG ORAL TABLET 3 MG: 3 TABLET ORAL at 21:55

## 2022-07-22 RX ADMIN — CITALOPRAM HYDROBROMIDE 10 MG: 20 TABLET ORAL at 09:36

## 2022-07-22 RX ADMIN — OXCARBAZEPINE 300 MG: 300 TABLET, FILM COATED ORAL at 09:34

## 2022-07-22 ASSESSMENT — PAIN SCALES - GENERAL
PAINLEVEL_OUTOF10: 0
PAINLEVEL_OUTOF10: 0
PAINLEVEL_OUTOF10: 10

## 2022-07-22 ASSESSMENT — PAIN DESCRIPTION - LOCATION: LOCATION: HEAD

## 2022-07-22 ASSESSMENT — PAIN DESCRIPTION - ORIENTATION: ORIENTATION: MID

## 2022-07-22 ASSESSMENT — PAIN - FUNCTIONAL ASSESSMENT: PAIN_FUNCTIONAL_ASSESSMENT: ACTIVITIES ARE NOT PREVENTED

## 2022-07-22 ASSESSMENT — PAIN DESCRIPTION - DESCRIPTORS: DESCRIPTORS: ACHING;THROBBING

## 2022-07-22 NOTE — PLAN OF CARE
Problem: Self Harm/Suicidality  Goal: Will have no self-injury during hospital stay  Description: INTERVENTIONS:  1. Q 30 MINUTES: Routine safety checks  2. Q SHIFT & PRN: Assess risk to determine if routine checks are adequate to maintain patient safety  7/22/2022 1811 by April REGGIE Hinson  Outcome: Progressing  7/22/2022 1551 by Paolo Perkins RN  Outcome: Progressing     Problem: Depression  Goal: Will be euthymic at discharge  Description: INTERVENTIONS:  1. Administer medication as ordered  2. Provide emotional support via 1:1 interaction with staff  3. Encourage involvement in milieu/groups/activities  4. Monitor for social isolation  Outcome: Progressing     Problem: Behavior  Goal: Pt/Family maintain appropriate behavior and adhere to behavioral management agreement, if implemented  Description: INTERVENTIONS:  1. Assess patient/family's coping skills and  non-compliant behavior (including use of illegal substances)  2. Notify security of behavior or suspected illegal substances which indicate the need for search of the family and/or belongings  3. Encourage verbalization of thoughts and concerns in a socially appropriate manner  4. Utilize positive, consistent limit setting strategies supporting safety of patient, staff and others  5. Encourage participation in the decision making process about the behavioral management agreement  6. If a visitor's behavior poses a threat to safety call refer to organization policy. 7. Initiate consult with , Psychosocial CNS, Spiritual Care as appropriate  Outcome: Progressing     Problem: Anxiety  Goal: Will report anxiety at manageable levels  Description: INTERVENTIONS:  1. Administer medication as ordered  2. Teach and rehearse alternative coping skills  3.  Provide emotional support with 1:1 interaction with staff  7/22/2022 1811 by April REGGIE Hinson  Outcome: Progressing  7/22/2022 1551 by Paolo Perkins RN  Outcome: Progressing     Problem: Sleep Disturbance  Goal: Will exhibit normal sleeping pattern  Description: INTERVENTIONS:  1. Administer medication as ordered  2. Decrease environmental stimuli, including noise, as appropriate  3. Discourage social isolation and naps during the day  Outcome: Progressing     Problem: Involuntary Admit  Goal: Will cooperate with staff recommendations and doctor's orders and will demonstrate appropriate behavior  Description: INTERVENTIONS:  1. Treat underlying conditions and offer medication as ordered  2. Educate regarding involuntary admission procedures and rules  3.  Contain excessive/inappropriate behavior per unit and hospital policies  Outcome: Progressing     Problem: Pain  Goal: Verbalizes/displays adequate comfort level or baseline comfort level  Outcome: Progressing

## 2022-07-22 NOTE — PROGRESS NOTES
BEHAVIORAL HEALTH FOLLOW-UP NOTE     7/22/2022     Patient was seen and examined in person, Chart reviewed   Patient's case discussed with staff/team    Chief Complaint: \" I am alright. \"    Interim History:   I saw patient this morning in treatment team.  She makes very poor eye contact continues with a flat blunted affect although she states she is no longer having any suicidal ideations and states that she is feeling \"better. \"  When asked what changed for her she states that she learned in group that she needs to speak up and tell her family what she needs and if she is upset and needs more support from them. States that she talked to her sister and that her sister did not realize that she was having a hard time dealing with the loss of their other sister as well. Still flat and blunted but affect is improving she has been out attending groups more social with peers.     Appetite:   [x] Normal/Unchanged  [] Increased  [] Decreased      Sleep:       [x] Normal/Unchanged  [] Fair       [] Poor              Energy:    [x] Normal/Unchanged  [] Increased  [] Decreased        SI [] Present  [x] Absent    HI  []Present  [x] Absent     Aggression:  [] yes  [x] no    Patient is [x] able  [] unable to CONTRACT FOR SAFETY     PAST MEDICAL/PSYCHIATRIC HISTORY:   Past Medical History:   Diagnosis Date    Moderate episode of recurrent major depressive disorder ([facility] [address] 11/26/2021       FAMILY/SOCIAL HISTORY:  Family History   Problem Relation Age of Onset    Other Maternal Grandmother         enlarged thyroid    Kidney Disease Maternal Grandfather      Social History     Socioeconomic History    Marital status: Single     Spouse name: Not on file    Number of children: Not on file    Years of education: Not on file    Highest education level: Not on file   Occupational History    Not on file   Tobacco Use    Smoking status: Never     Passive exposure: Yes    Smokeless tobacco: Never   Vaping Use    Vaping Use: Never used Substance and Sexual Activity    Alcohol use: No    Drug use: No    Sexual activity: Never   Other Topics Concern    Not on file   Social History Narrative    Not on file     Social Determinants of Health     Financial Resource Strain: Low Risk     Difficulty of Paying Living Expenses: Not hard at all   Food Insecurity: Food Insecurity Present    Worried About [address] in the Last Year: Often true    Ran Out of Food in the Last Year: Often true   Transportation Needs: No Transportation Needs    Lack of Transportation (Medical): No    Lack of Transportation (Non-Medical): No   Physical Activity: Not on file   Stress: Not on file   Social Connections: Not on file   Intimate Partner Violence: Not on file   Housing Stability: Not on file           ROS:  [x] All negative/unchanged except if checked.  Explain positive(checked items) below:  [] Constitutional  [] Eyes  [] Ear/Nose/Mouth/Throat  [] Respiratory  [] CV  [] GI  []   [] Musculoskeletal  [] Skin/Breast  [] Neurological  [] Endocrine  [] Heme/Lymph  [] Allergic/Immunologic    Explanation:     MEDICATIONS:    Current Facility-Administered Medications:     OXcarbazepine (TRILEPTAL) tablet 300 mg, 300 mg, Oral, BID, Providence Leventhal Dellick, APRN - CNP, 036 mg at 07/22/22 0798    acetaminophen (TYLENOL) tablet 650 mg, 650 mg, Oral, Q6H PRN, Gerard Horton MD    magnesium hydroxide (MILK OF MAGNESIA) 400 MG/5ML suspension 30 mL, 30 mL, Oral, Daily PRN, Gerard Horton MD    nicotine (NICODERM CQ) 21 MG/24HR 1 patch, 1 patch, TransDERmal, Daily, Gerard Horton MD    aluminum & magnesium hydroxide-simethicone (MAALOX) 200-200-20 MG/5ML suspension 30 mL, 30 mL, Oral, PRN, Gerard Horton MD    hydrOXYzine pamoate (VISTARIL) capsule 50 mg, 50 mg, Oral, TID PRN, Gerard Horton MD, 50 mg at 07/21/22 2113    haloperidol (HALDOL) tablet 5 mg, 5 mg, Oral, Q6H PRN **OR** haloperidol lactate (HALDOL) injection 5 mg, 5 mg, IntraMUSCular, Q6H PRN, Giulia Smith MD Rohith    melatonin tablet 3 mg, 3 mg, Oral, Nightly, Brenda Juarez MD, 3 mg at 07/21/22 2113    citalopram (CELEXA) tablet 10 mg, 10 mg, Oral, Daily, Chris Colon, APRN - CNP, 10 mg at 07/22/22 5816      Examination:  BP (!) 98/57   Pulse 65   Temp 98.5 °F (36.9 °C)   Resp 14   Ht 5' 7\" (1.702 m)   Wt 197 lb (89.4 kg)   LMP 06/18/2022 (Approximate)   SpO2 99%   BMI 30.85 kg/m²   Gait - steady  Medication side effects(SE):     Mental Status Examination:    Level of consciousness:  within normal limits   Appearance:  fair grooming and fair hygiene  Behavior/Motor: Poor eye contact  Attitude toward examiner:  cooperative  Speech:  spontaneous, normal rate and normal volume   Mood: \" I am okay. \"  Affect: Flat and blunted  Thought processes: Linear thought flight of ideas loose associations  Thought content: Devoid of any auditory visual hallucinations delusions or other perceptual normalities. Denies SI/HI intent or plan  Cognition:  oriented to person, place, and time   Concentration intact  Insight Limited  Judgement Limited              ASSESSMENT:   Patient symptoms are:  [] Well controlled  [] Improving  [] Worsening  [x] No change      Diagnosis:   Principal Problem:    Bipolar 2 disorder, major depressive episode ([facility] [address]  Active Problems:    [facility]  Resolved Problems:    * No resolved hospital problems.  *      LABS:    Recent Labs     07/19/22  1329   WBC 6.5   HGB 12.8        Recent Labs     07/19/22  1329      K 3.5      CO2 18*   BUN 6   CREATININE 0.7   GLUCOSE 89     Recent Labs     07/19/22  1329   BILITOT 0.4   ALKPHOS 63   AST 14   ALT 9     Lab Results   Component Value Date/Time    LABAMPH NOT DETECTED 07/19/2022 01:13 PM    BARBSCNU NOT DETECTED 07/19/2022 01:13 PM    LABBENZ NOT DETECTED 07/19/2022 01:13 PM    LABMETH NOT DETECTED 07/19/2022 01:13 PM    OPIATESCREENURINE NOT DETECTED 07/19/2022 01:13 PM    PHENCYCLIDINESCREENURINE NOT DETECTED 07/19/2022 01:13 PM    ETOH <10 07/19/2022 01:29 PM     No results found for: TSH, FREET4  No results found for: LITHIUM  No results found for: VALPROATE, CBMZ        Treatment Plan:  Reviewed current Medications with the patient. Risks, benefits, side effects, drug-to-drug interactions and alternatives to treatment were discussed. Collateral information:   CD evaluation  Encourage patient to attend group and other milieu activities.   Discharge planning discussed with the patient and treatment team.    Continue Trileptal to 300 mg twice daily  Continue Celexa 10 mg daily    PSYCHOTHERAPY/COUNSELING:  [x] Therapeutic interview  [x] Supportive  [] CBT  [] Ongoing  [] Other    [x] Patient continues to need, on a daily basis, active treatment furnished directly by or requiring the supervision of inpatient psychiatric personnel      Anticipated Length of stay: 3 to 7 days based on stability            Electronically signed by SONIA Frye CNP on 9/96/2552 at 9:39 AM

## 2022-07-22 NOTE — GROUP NOTE
Group Therapy Note    Date: 7/22/2022    Group Start Time: 3373  Group End Time: 4119  Group Topic: Psychoeducation    [facility]    Ruperto Abdias, [address]        Group Therapy Note                                                                        Group Therapy Note    Date: 7/22/2022    Type of Group: Psychoeducation    Wellness Binder Information  Module Name:  7 cardinal rule   Patient's Goal:  patient will be able to id what mantra one uses to stay motivated. Notes:  Patient able to share and participate appropriately. Status After Intervention:  Improved    Participation Level:  Active Listener and Interactive    Participation Quality: Appropriate, Attentive, and Sharing      Speech:  normal      Thought Process/Content: Logical      Affective Functioning: Congruent      Mood: euthymic      Level of consciousness:  Alert, Oriented x4, and Attentive      Response to Learning: Able to verbalize/acknowledge new learning, Able to retain information, and Progressing to goal      Endings: None Reported    Modes of Intervention: Education, Support, Socialization, Exploration, and Problem-solving      Discipline Responsible: Psychoeducational Specialist      Signature:  Bashir Anthony

## 2022-07-22 NOTE — CARE COORDINATION
SW spoke with pt who reported that [place], Pt originally stated that she would like to go to [facility] but felt that it was too far away from home, pt then stated that she would like to go to [facility] for follow up appointments. MARTHA called [facility] [phone], SW left a voicemail with Ellen Walsh requesting a call back to schedule follow up appointments.      UPDATE: MARTHA scheduled follow up appointment at [facility] 7/26 at [facility].

## 2022-07-22 NOTE — PLAN OF CARE
Problem: Self Harm/Suicidality  Goal: Will have no self-injury during hospital stay  Description: INTERVENTIONS:  1. Q 30 MINUTES: Routine safety checks  2. Q SHIFT & PRN: Assess risk to determine if routine checks are adequate to maintain patient safety  Outcome: Progressing  PT. DENIES SUICIDAL IDEATIONS. Problem: Anxiety  Goal: Will report anxiety at manageable levels  Description: INTERVENTIONS:  1. Administer medication as ordered  2. Teach and rehearse alternative coping skills  3. Provide emotional support with 1:1 interaction with staff  Outcome: Progressing  PT. REPORTS A DECREASE IN ANXIETY WITH MEDICATIONS AS ORDERED. PT. IS UP ON UNIT AND IN CONTROL. DENIES SUICIDAL IDEATIONS, NO SELF HARM. PT. IS NOTED TO BE SOCIAL AND PLAYING CARDS WITH PEERS. PT. REMAINS MEDICATION COMPLIANT AND DID SIGN VOLUNTARY CONSENT. [address] [place] [address]  Day 3 Interdisciplinary Treatment Plan NOTE    Review Date & Time: 7/22/22 1000    Patient was in treatment team    Estimated Length of Stay Update:  5 DAYS  Estimated Discharge Date Update:  MONDAY    EDUCATION:   Learner Progress Toward Treatment Goals: Reviewed results and recommendations of this team    Method: Small group    Outcome: Verbalized understanding and Needs reinforcement    PATIENT GOALS: \"GO TO GROUPS\"    PLAN/TREATMENT RECOMMENDATIONS UPDATE: ASSESS FOR SUICIDE RISK, GROUPS AND MEDICATIONS, VOLUNTARY CONSENT, DISCHARGE IN NEXT 48 TO 72 HRS.      GOALS UPDATE:   Time frame for Short-Term Goals:  5 DAYS      Chester Alvarez RN

## 2022-07-22 NOTE — GROUP NOTE
Group Therapy Note    Date: 7/22/2022    Group Start Time: 1100  Group End Time: 1130  Group Topic: Cognitive Skills    [facility]    EYAL Munoz, \[facility] [place] [facility]\""        Group Therapy Note    Attendees: 13       Patient's Goal:  Pt will be able to identify positive relationships and ways to resolve any conflict with others approprietly. Notes:  Pt attended group and made connections with other group members. Status After Intervention:  Improved    Participation Level:  Active Listener and Interactive    Participation Quality: Appropriate, Attentive, and Sharing      Speech:  normal      Thought Process/Content: Logical  Linear      Affective Functioning: Congruent      Mood: anxious      Level of consciousness:  Alert and Oriented x4      Response to Learning: Able to verbalize current knowledge/experience, Able to verbalize/acknowledge new learning, and Able to retain information      Endings: None Reported    Modes of Intervention: Education, Support, Socialization, Exploration, Clarifying, and Problem-solving      Discipline Responsible: /Counselor      Signature:  EYAL Munoz, [place]

## 2022-07-23 PROCEDURE — 1240000000 HC EMOTIONAL WELLNESS R&B

## 2022-07-23 PROCEDURE — 6370000000 HC RX 637 (ALT 250 FOR IP): Performed by: PSYCHIATRY & NEUROLOGY

## 2022-07-23 PROCEDURE — 99231 SBSQ HOSP IP/OBS SF/LOW 25: CPT | Performed by: NURSE PRACTITIONER

## 2022-07-23 PROCEDURE — 6370000000 HC RX 637 (ALT 250 FOR IP): Performed by: NURSE PRACTITIONER

## 2022-07-23 RX ADMIN — CITALOPRAM HYDROBROMIDE 10 MG: 20 TABLET ORAL at 09:10

## 2022-07-23 RX ADMIN — OXCARBAZEPINE 300 MG: 300 TABLET, FILM COATED ORAL at 09:10

## 2022-07-23 RX ADMIN — MELATONIN 3 MG ORAL TABLET 3 MG: 3 TABLET ORAL at 22:00

## 2022-07-23 RX ADMIN — OXCARBAZEPINE 300 MG: 300 TABLET, FILM COATED ORAL at 22:00

## 2022-07-23 ASSESSMENT — PAIN SCALES - GENERAL: PAINLEVEL_OUTOF10: 0

## 2022-07-23 NOTE — PROGRESS NOTES
Pt. Alert and oriented, denies SI,HI, and hallucinations. Pt. Calm, cooperative to assessments, anxious about when to be discharged. Attends groups, meal and medication complaints. Pt observed pacing on the [place] with peers. No aggression or behaviors to report. Staff will continue to monitor pt. for safety.

## 2022-07-23 NOTE — PLAN OF CARE
Problem: Self Harm/Suicidality  Goal: Will have no self-injury during hospital stay  Description: INTERVENTIONS:  1. Q 30 MINUTES: Routine safety checks  2. Q SHIFT & PRN: Assess risk to determine if routine checks are adequate to maintain patient safety  7/23/2022 1246 by Eric Hdz RN  Outcome: Progressing  7/23/2022 0434 by Lucillie Severs, RN  Outcome: Progressing     Problem: Depression  Goal: Will be euthymic at discharge  Description: INTERVENTIONS:  1. Administer medication as ordered  2. Provide emotional support via 1:1 interaction with staff  3. Encourage involvement in milieu/groups/activities  4. Monitor for social isolation  7/23/2022 0434 by Lucillie Severs, RN  Outcome: Progressing        Patient flat and depressed. Social on the unit. Medication compliant and does not attend groups. Denies suicidal and homicidal thoughts. Denies hallucinations.

## 2022-07-23 NOTE — BH NOTE
Patient social with peers . Reading in room at times. Denies suicidal and homicidal thoughts . Denies hallucinations.

## 2022-07-23 NOTE — PROGRESS NOTES
Spiritual Support Group Note    Number of Participants in Group:    13                    Time:   1:30    Goal: Relief from isolation and loneliness             Judy Sharing             Self-understanding and gain insight              Acceptance and belonging            Recognize they are not alone                Socialization             Empowerment       Encouragement    Topic:  [x] Spiritual Wellness and Self Care                  [] Hope                     [] Connecting with Divine/Others        [x] Thankfulness and Gratitude               [x]  Meaningfulness and Purpose               [] Forgiveness               [] Peace               [] Connect to [employer]      [] Other    Participation Level:   [x] Active Listener   [] Minimal   [] Monopolizing   [] Interactive   [] No Participation   []  Other:     Attention:   [x] Alert   [] Distractible   [] Drowsy   [] Poor   [] Other:    Manner:   [x] Cooperative   [] Suspicious   [] Withdrawn   [] Guarded   [] Irritable   [] Inhospitable   [] Other:     Others Comments from Group:

## 2022-07-23 NOTE — CARE COORDINATION
Sw met with pt to check in. Pt reported that she is doing well today and she is ready to go home. Pt stated that she is feeling good and she is going to be discharging home with her sister. Pt was seen in the common area socializing with peers and coloring.

## 2022-07-23 NOTE — PROGRESS NOTES
Social Determinants of Health     Financial Resource Strain: Low Risk     Difficulty of Paying Living Expenses: Not hard at all   Food Insecurity: Food Insecurity Present    Worried About [address] [employer] in the Last Year: Often true    Ran Out of Food in the Last Year: Often true   Transportation Needs: No Transportation Needs    Lack of Transportation (Medical): No    Lack of Transportation (Non-Medical): No   Physical Activity: Not on file   Stress: Not on file   Social Connections: Not on file   Intimate Partner Violence: Not on file   Housing Stability: Not on file           ROS:  [x] All negative/unchanged except if checked.  Explain positive(checked items) below:  [] Constitutional  [] Eyes  [] Ear/Nose/Mouth/Throat  [] Respiratory  [] CV  [] GI  []   [] Musculoskeletal  [] Skin/Breast  [] Neurological  [] Endocrine  [] Heme/Lymph  [] Allergic/Immunologic    Explanation:     MEDICATIONS:    Current Facility-Administered Medications:     OXcarbazepine (TRILEPTAL) tablet 300 mg, 300 mg, Oral, BID, Chrystine Low Dellick, APRN - CNP, 678 mg at 07/22/22 2155    acetaminophen (TYLENOL) tablet 650 mg, 650 mg, Oral, Q6H PRN, Jay Marion MD, 650 mg at 07/22/22 2156    magnesium hydroxide (MILK OF MAGNESIA) 400 MG/5ML suspension 30 mL, 30 mL, Oral, Daily PRN, Jay Marion MD    nicotine (NICODERM CQ) 21 MG/24HR 1 patch, 1 patch, TransDERmal, Daily, Jay Marion MD    aluminum & magnesium hydroxide-simethicone (MAALOX) 200-200-20 MG/5ML suspension 30 mL, 30 mL, Oral, PRN, Jay Marion MD    hydrOXYzine pamoate (VISTARIL) capsule 50 mg, 50 mg, Oral, TID PRN, Jay Marion MD, 50 mg at 07/21/22 2113    haloperidol (HALDOL) tablet 5 mg, 5 mg, Oral, Q6H PRN **OR** haloperidol lactate (HALDOL) injection 5 mg, 5 mg, IntraMUSCular, Q6H PRN, Jay Marion MD    melatonin tablet 3 mg, 3 mg, Oral, Nightly, Jay Marion MD, 3 mg at 07/22/22 2155    citalopram (CELEXA) tablet 10 mg, 10 mg, Oral, Daily, Krishankarolina Morfin, APRN - CNP, 10 mg at 07/22/22 0016      Examination:  /70   Pulse 73   Temp 97.8 °F (36.6 °C)   Resp 14   Ht 5' 7\" (1.702 m)   Wt 197 lb (89.4 kg)   LMP 06/18/2022 (Approximate)   SpO2 99%   BMI 30.85 kg/m²   Gait - steady  Medication side effects(SE):     Mental Status Examination:    Level of consciousness:  within normal limits   Appearance:  fair grooming and fair hygiene  Behavior/Motor: Poor eye contact  Attitude toward examiner:  cooperative  Speech:  spontaneous, normal rate and normal volume   Mood: \" I am okay. \"  Affect: Flat and blunted  Thought processes: Linear thought flight of ideas loose associations  Thought content: Devoid of any auditory visual hallucinations delusions or other perceptual normalities. Denies SI/HI intent or plan  Cognition:  oriented to person, place, and time   Concentration intact  Insight Limited  Judgement Limited              ASSESSMENT:   Patient symptoms are:  [] Well controlled  [] Improving  [] Worsening  [x] No change      Diagnosis:   Principal Problem:    Bipolar 2 disorder, major depressive episode ([facility] [address]  Active Problems:    [facility]  Resolved Problems:    * No resolved hospital problems. *      LABS:    No results for input(s): WBC, HGB, PLT in the last 72 hours. No results for input(s): NA, K, CL, CO2, BUN, CREATININE, GLUCOSE in the last 72 hours. No results for input(s): BILITOT, ALKPHOS, AST, ALT in the last 72 hours.     Lab Results   Component Value Date/Time    LABAMPH NOT DETECTED 07/19/2022 01:13 PM    BARBSCNU NOT DETECTED 07/19/2022 01:13 PM    LABBENZ NOT DETECTED 07/19/2022 01:13 PM    LABMETH NOT DETECTED 07/19/2022 01:13 PM    OPIATESCREENURINE NOT DETECTED 07/19/2022 01:13 PM    PHENCYCLIDINESCREENURINE NOT DETECTED 07/19/2022 01:13 PM    ETOH <10 07/19/2022 01:29 PM     No results found for: TSH, FREET4  No results found for: LITHIUM  No results found for: VALPROATE, CBMZ        Treatment Plan:  Reviewed current Medications with the patient. Risks, benefits, side effects, drug-to-drug interactions and alternatives to treatment were discussed. Collateral information:   CD evaluation  Encourage patient to attend group and other milieu activities.   Discharge planning discussed with the patient and treatment team.    Continue Trileptal to 300 mg twice daily  Continue Celexa 10 mg daily    PSYCHOTHERAPY/COUNSELING:  [x] Therapeutic interview  [x] Supportive  [] CBT  [] Ongoing  [] Other    [x] Patient continues to need, on a daily basis, active treatment furnished directly by or requiring the supervision of inpatient psychiatric personnel      Anticipated Length of stay: 3 to 7 days based on stability            Electronically signed by SONIA Simmons CNP on 7/23/2022 at 9:03 AM

## 2022-07-24 PROCEDURE — 1240000000 HC EMOTIONAL WELLNESS R&B

## 2022-07-24 PROCEDURE — 6370000000 HC RX 637 (ALT 250 FOR IP): Performed by: NURSE PRACTITIONER

## 2022-07-24 PROCEDURE — 6370000000 HC RX 637 (ALT 250 FOR IP): Performed by: PSYCHIATRY & NEUROLOGY

## 2022-07-24 PROCEDURE — 99231 SBSQ HOSP IP/OBS SF/LOW 25: CPT | Performed by: NURSE PRACTITIONER

## 2022-07-24 RX ADMIN — OXCARBAZEPINE 300 MG: 300 TABLET, FILM COATED ORAL at 21:20

## 2022-07-24 RX ADMIN — CITALOPRAM HYDROBROMIDE 10 MG: 20 TABLET ORAL at 10:01

## 2022-07-24 RX ADMIN — OXCARBAZEPINE 300 MG: 300 TABLET, FILM COATED ORAL at 10:01

## 2022-07-24 RX ADMIN — HYDROXYZINE PAMOATE 50 MG: 50 CAPSULE ORAL at 21:20

## 2022-07-24 RX ADMIN — MELATONIN 3 MG ORAL TABLET 3 MG: 3 TABLET ORAL at 21:20

## 2022-07-24 RX ADMIN — ACETAMINOPHEN 325MG 650 MG: 325 TABLET ORAL at 09:43

## 2022-07-24 ASSESSMENT — PAIN SCALES - GENERAL
PAINLEVEL_OUTOF10: 0
PAINLEVEL_OUTOF10: 9

## 2022-07-24 ASSESSMENT — PAIN DESCRIPTION - LOCATION: LOCATION: ABDOMEN

## 2022-07-24 ASSESSMENT — PAIN DESCRIPTION - DESCRIPTORS: DESCRIPTORS: CRAMPING

## 2022-07-24 ASSESSMENT — PAIN DESCRIPTION - ORIENTATION: ORIENTATION: LOWER

## 2022-07-24 NOTE — PROGRESS NOTES
BEHAVIORAL HEALTH FOLLOW-UP NOTE     7/24/2022     Patient was seen and examined in person, Chart reviewed   Patient's case discussed with staff/team    Chief Complaint: \"Did anyone ask the doctor if I could leave? \"     Interim History:   I saw patient this morning in the day room after breakfast, she is brighter today, tells me that he started her period so is feeling irritable from that. Still flat and blunted but affect is improving she has been out attending groups more social with peers.     Appetite:   [x] Normal/Unchanged  [] Increased  [] Decreased      Sleep:       [x] Normal/Unchanged  [] Fair       [] Poor              Energy:    [x] Normal/Unchanged  [] Increased  [] Decreased        SI [] Present  [x] Absent    HI  []Present  [x] Absent     Aggression:  [] yes  [x] no    Patient is [x] able  [] unable to CONTRACT FOR SAFETY     PAST MEDICAL/PSYCHIATRIC HISTORY:   Past Medical History:   Diagnosis Date    Moderate episode of recurrent major depressive disorder ([facility] [address] 11/26/2021       FAMILY/SOCIAL HISTORY:  Family History   Problem Relation Age of Onset    Other Maternal Grandmother         enlarged thyroid    Kidney Disease Maternal Grandfather      Social History     Socioeconomic History    Marital status: Single     Spouse name: Not on file    Number of children: Not on file    Years of education: Not on file    Highest education level: Not on file   Occupational History    Not on file   Tobacco Use    Smoking status: Never     Passive exposure: Yes    Smokeless tobacco: Never   Vaping Use    Vaping Use: Never used   Substance and Sexual Activity    Alcohol use: No    Drug use: No    Sexual activity: Never   Other Topics Concern    Not on file   Social History Narrative    Not on file     Social Determinants of Health     Financial Resource Strain: Low Risk     Difficulty of Paying Living Expenses: Not hard at all   Food Insecurity: Food Insecurity Present    Worried About [address] in the Last Year: Often true    Ran Out of Food in the Last Year: Often true   Transportation Needs: No Transportation Needs    Lack of Transportation (Medical): No    Lack of Transportation (Non-Medical): No   Physical Activity: Not on file   Stress: Not on file   Social Connections: Not on file   Intimate Partner Violence: Not on file   Housing Stability: Not on file           ROS:  [x] All negative/unchanged except if checked.  Explain positive(checked items) below:  [] Constitutional  [] Eyes  [] Ear/Nose/Mouth/Throat  [] Respiratory  [] CV  [] GI  []   [] Musculoskeletal  [] Skin/Breast  [] Neurological  [] Endocrine  [] Heme/Lymph  [] Allergic/Immunologic    Explanation:     MEDICATIONS:    Current Facility-Administered Medications:     OXcarbazepine (TRILEPTAL) tablet 300 mg, 300 mg, Oral, BID, Drew Colon APRN - CNP, 460 mg at 07/23/22 2200    acetaminophen (TYLENOL) tablet 650 mg, 650 mg, Oral, Q6H PRN, Wade Gudino MD, 650 mg at 07/22/22 2156    magnesium hydroxide (MILK OF MAGNESIA) 400 MG/5ML suspension 30 mL, 30 mL, Oral, Daily PRN, Wade Gudino MD    nicotine (NICODERM CQ) 21 MG/24HR 1 patch, 1 patch, TransDERmal, Daily, Wade Gudino MD    aluminum & magnesium hydroxide-simethicone (MAALOX) 200-200-20 MG/5ML suspension 30 mL, 30 mL, Oral, PRN, Wade Gudino MD    hydrOXYzine pamoate (VISTARIL) capsule 50 mg, 50 mg, Oral, TID PRN, Wade Gudino MD, 50 mg at 07/21/22 2113    haloperidol (HALDOL) tablet 5 mg, 5 mg, Oral, Q6H PRN **OR** haloperidol lactate (HALDOL) injection 5 mg, 5 mg, IntraMUSCular, Q6H PRN, Wade Gudino MD    melatonin tablet 3 mg, 3 mg, Oral, Nightly, Wade Gudino MD, 3 mg at 07/23/22 2200    citalopram (CELEXA) tablet 10 mg, 10 mg, Oral, Daily, Darrell Lozano APRN - CNP, 10 mg at 07/23/22 0910      Examination:  BP (!) 100/58   Pulse 71   Temp 98 °F (36.7 °C)   Resp 14   Ht 5' 7\" (1.702 m)   Wt 197 lb (89.4 kg)   LMP 06/18/2022 (Approximate) SpO2 99%   BMI 30.85 kg/m²   Gait - steady  Medication side effects(SE):     Mental Status Examination:    Level of consciousness:  within normal limits   Appearance:  fair grooming and fair hygiene  Behavior/Motor: Poor eye contact  Attitude toward examiner:  cooperative  Speech:  spontaneous, normal rate and normal volume   Mood: \" I am okay. \"  Affect: Flat and blunted  Thought processes: Linear thought flight of ideas loose associations  Thought content: Devoid of any auditory visual hallucinations delusions or other perceptual normalities. Denies SI/HI intent or plan  Cognition:  oriented to person, place, and time   Concentration intact  Insight Limited  Judgement Limited              ASSESSMENT:   Patient symptoms are:  [] Well controlled  [] Improving  [] Worsening  [x] No change      Diagnosis:   Principal Problem:    Bipolar 2 disorder, major depressive episode ([facility] [address]  Active Problems:    [facility]  Resolved Problems:    * No resolved hospital problems. *      LABS:    No results for input(s): WBC, HGB, PLT in the last 72 hours. No results for input(s): NA, K, CL, CO2, BUN, CREATININE, GLUCOSE in the last 72 hours. No results for input(s): BILITOT, ALKPHOS, AST, ALT in the last 72 hours. Lab Results   Component Value Date/Time    LABAMPH NOT DETECTED 07/19/2022 01:13 PM    BARBSCNU NOT DETECTED 07/19/2022 01:13 PM    LABBENZ NOT DETECTED 07/19/2022 01:13 PM    LABMETH NOT DETECTED 07/19/2022 01:13 PM    OPIATESCREENURINE NOT DETECTED 07/19/2022 01:13 PM    PHENCYCLIDINESCREENURINE NOT DETECTED 07/19/2022 01:13 PM    ETOH <10 07/19/2022 01:29 PM     No results found for: TSH, FREET4  No results found for: LITHIUM  No results found for: VALPROATE, CBMZ        Treatment Plan:  Reviewed current Medications with the patient. Risks, benefits, side effects, drug-to-drug interactions and alternatives to treatment were discussed.   Collateral information:   CD evaluation  Encourage patient to attend group and other milieu activities.   Discharge planning discussed with the patient and treatment team.    Continue Trileptal to 300 mg twice daily  Continue Celexa 10 mg daily    PSYCHOTHERAPY/COUNSELING:  [x] Therapeutic interview  [x] Supportive  [] CBT  [] Ongoing  [] Other    [x] Patient continues to need, on a daily basis, active treatment furnished directly by or requiring the supervision of inpatient psychiatric personnel      Anticipated Length of stay: 3 to 7 days based on stability            Electronically signed by SONIA Griamldo CNP on 7/24/2022 at 8:47 AM

## 2022-07-24 NOTE — PLAN OF CARE
Patient denies SI/HI/Hallucinations. Patient has been isolative to room for entire evening. Medication complaint. NAD noted, respirations are even and unlabored. Will continue to monitor closely for changes in patient condition and will intervene as needed. Problem: Self Harm/Suicidality  Goal: Will have no self-injury during hospital stay  Description: INTERVENTIONS:  1. Q 30 MINUTES: Routine safety checks  2. Q SHIFT & PRN: Assess risk to determine if routine checks are adequate to maintain patient safety  7/23/2022 2301 by Evelyn Alfredo RN  Outcome: Progressing     Problem: Behavior  Goal: Pt/Family maintain appropriate behavior and adhere to behavioral management agreement, if implemented  Description: INTERVENTIONS:  1. Assess patient/family's coping skills and  non-compliant behavior (including use of illegal substances)  2. Notify security of behavior or suspected illegal substances which indicate the need for search of the family and/or belongings  3. Encourage verbalization of thoughts and concerns in a socially appropriate manner  4. Utilize positive, consistent limit setting strategies supporting safety of patient, staff and others  5. Encourage participation in the decision making process about the behavioral management agreement  6. If a visitor's behavior poses a threat to safety call refer to organization policy. 7. Initiate consult with , Psychosocial CNS, Spiritual Care as appropriate  7/23/2022 2301 by Evelyn Alfredo RN  Outcome: Progressing     Problem: Involuntary Admit  Goal: Will cooperate with staff recommendations and doctor's orders and will demonstrate appropriate behavior  Description: INTERVENTIONS:  1. Treat underlying conditions and offer medication as ordered  2. Educate regarding involuntary admission procedures and rules  3.  Contain excessive/inappropriate behavior per unit and hospital policies  Outcome: Progressing

## 2022-07-24 NOTE — GROUP NOTE
Group Therapy Note    Date: 7/24/2022    Group Start Time: 1000  Group End Time: 1100  Group Topic: Cognitive Skills    [facility]    Thankful EYAL Ronquillo LSW        Group Therapy Note    Attendees: 12       Patient's Goal:  Pt attended community meeting and psychoeducational group about trauma. Notes:  Pt shared their daily goal as to not self isolate. Status After Intervention:  Improved    Participation Level: Active Listener    Participation Quality: Appropriate and Attentive      Speech:  normal      Thought Process/Content: Logical      Affective Functioning: Congruent      Mood: anxious      Level of consciousness:  Alert, Oriented x4, and Attentive      Response to Learning: Able to verbalize current knowledge/experience, Able to verbalize/acknowledge new learning, and Able to retain information      Endings: None Reported    Modes of Intervention: Education, Support, Socialization, Exploration, Clarifying, and Problem-solving      Discipline Responsible: /Counselor      Signature:   EYAL Lopez LSW

## 2022-07-24 NOTE — GROUP NOTE
Group Therapy Note    Date: 7/24/2022    Group Start Time: 1430  Group End Time: 1678  Group Topic: Cognitive Skills    [facility]    Thankful EYAL Ronquillo, SALVADOR        Group Therapy Note    Attendees: 16       Patient's Goal:  Pt will learn appropriate and healthy ways to communicate to get their needs met. Notes:  Pt was alert and attentive during group therapy. Status After Intervention:  Improved    Participation Level: Active Listener and Interactive    Participation Quality: Appropriate, Attentive, and Sharing      Speech:  normal      Thought Process/Content: Logical      Affective Functioning: Congruent      Mood: euthymic      Level of consciousness:  Alert, Oriented x4, and Attentive      Response to Learning: Able to verbalize current knowledge/experience, Able to verbalize/acknowledge new learning, and Able to retain information      Endings: None Reported    Modes of Intervention: Education, Support, Socialization, Exploration, Clarifying, and Problem-solving      Discipline Responsible: /Counselor      Signature:   EYAL Rushing LSW

## 2022-07-24 NOTE — PROGRESS NOTES
Patient resting in bed. No behaviors noted. NAD noted, respirations are even and unlabored. Will continue to monitor and will intervene as needed.

## 2022-07-24 NOTE — CARE COORDINATION
Sw met with pt to discuss discharge plan. Pt reported that he plan is to discharge back home where she lives with her sister. Pt stated that she will be following up with [facility] upon discharge. Pt asked Sw if she will need to call and schedule an appointment, SW informed pt that her appointment will be scheduled and the information will be in her discharge paperwork. Sw informed pt that the paperwork will have the appointment date and time, the location of the appointment and a phone number to call. Pt expressed no further questions about discharge plan.

## 2022-07-24 NOTE — PLAN OF CARE
Problem: Self Harm/Suicidality  Goal: Will have no self-injury during hospital stay  Description: INTERVENTIONS:  1. Q 30 MINUTES: Routine safety checks  2. Q SHIFT & PRN: Assess risk to determine if routine checks are adequate to maintain patient safety  7/23/2022 2301 by Amena Sanchez RN  Outcome: Progressing     Problem: Depression  Goal: Will be euthymic at discharge  Description: INTERVENTIONS:  1. Administer medication as ordered  2. Provide emotional support via 1:1 interaction with staff  3. Encourage involvement in milieu/groups/activities  4. Monitor for social isolation  Outcome: Progressing            Patient pleasant and polite. Affect and mood improving. Complaining of menses cramping. She did attend group this morning and is medication complaint. Denies suicidal and homicidal thoughts. Denies hallucinations.

## 2022-07-24 NOTE — PLAN OF CARE
Social out on the unit. Denies suicidal thoughts or intent to harm herself or others. No voiced delusions. Denies hallucinations. Taking po foods and fluids well. Problem: Self Harm/Suicidality  Goal: Will have no self-injury during hospital stay  Description: INTERVENTIONS:  1. Q 30 MINUTES: Routine safety checks  2. Q SHIFT & PRN: Assess risk to determine if routine checks are adequate to maintain patient safety  Outcome: Progressing     Problem: Anxiety  Goal: Will report anxiety at manageable levels  Description: INTERVENTIONS:  1. Administer medication as ordered  2. Teach and rehearse alternative coping skills  3.  Provide emotional support with 1:1 interaction with staff  7/24/2022 1617 by Maeve Munson RN  Outcome: Progressing  7/24/2022 1149 by Daniel Gil RN  Outcome: Progressing

## 2022-07-25 VITALS
HEART RATE: 58 BPM | WEIGHT: 197 LBS | DIASTOLIC BLOOD PRESSURE: 56 MMHG | HEIGHT: 67 IN | SYSTOLIC BLOOD PRESSURE: 98 MMHG | OXYGEN SATURATION: 99 % | BODY MASS INDEX: 30.92 KG/M2 | RESPIRATION RATE: 14 BRPM | TEMPERATURE: 98 F

## 2022-07-25 PROCEDURE — 6370000000 HC RX 637 (ALT 250 FOR IP): Performed by: NURSE PRACTITIONER

## 2022-07-25 PROCEDURE — 99239 HOSP IP/OBS DSCHRG MGMT >30: CPT | Performed by: NURSE PRACTITIONER

## 2022-07-25 RX ORDER — OXCARBAZEPINE 300 MG/1
300 TABLET, FILM COATED ORAL 2 TIMES DAILY
Qty: 60 TABLET | Refills: 0 | Status: SHIPPED | OUTPATIENT
Start: 2022-07-25 | End: 2022-08-24

## 2022-07-25 RX ORDER — CITALOPRAM 10 MG/1
10 TABLET ORAL DAILY
Qty: 30 TABLET | Refills: 0 | Status: SHIPPED | OUTPATIENT
Start: 2022-07-26 | End: 2022-08-25

## 2022-07-25 RX ORDER — LANOLIN ALCOHOL/MO/W.PET/CERES
3 CREAM (GRAM) TOPICAL NIGHTLY
Qty: 30 TABLET | Refills: 0 | Status: SHIPPED | OUTPATIENT
Start: 2022-07-25 | End: 2022-08-24

## 2022-07-25 RX ORDER — NICOTINE 21 MG/24HR
1 PATCH, TRANSDERMAL 24 HOURS TRANSDERMAL DAILY
Qty: 30 PATCH | Refills: 0
Start: 2022-07-26 | End: 2022-08-25

## 2022-07-25 RX ADMIN — CITALOPRAM HYDROBROMIDE 10 MG: 20 TABLET ORAL at 08:59

## 2022-07-25 RX ADMIN — OXCARBAZEPINE 300 MG: 300 TABLET, FILM COATED ORAL at 08:59

## 2022-07-25 ASSESSMENT — PAIN SCALES - GENERAL
PAINLEVEL_OUTOF10: 0
PAINLEVEL_OUTOF10: 0

## 2022-07-25 NOTE — PROGRESS NOTES
CLINICAL PHARMACY NOTE: MEDS TO BEDS    Total # of Prescriptions Filled: 2   The following medications were delivered to the patient:  Citalopram 10mg  Oxcarbazepine 300mg    Additional Documentation:  Delivered to [facility] RN 7/25/22

## 2022-07-25 NOTE — CARE COORDINATION
Sw met with pt to discuss discharge plan for today. Pt stated that she will be returning back to her sisters home and her sister will be able to pick her up from the hospital today. Pt stated that she is ready to go home and she is not having any depression or anxiety. Pt stated that she is looking forward to starting counseling services and getting better. Pt reported that she would like to start working on herself to become better. Pt is alert and oriented x4. Pt's mood is brightened, affect is congruent. Pt speech is clear, rate and volume is normal. Pt's eye contact is good, pt's insight and judgement is improved. Pt denied SI, HI, AVH. Pt reported that she can be reached at [phone] for her follow up call. SW called pt's sister Aguila Delcid  (JORGE signed) to discuss discharge for today. Sister denied having any questions or concerns about discharge for today. Sister stated that she will be picking her up when she is ready. RN is able to call sister after discharge paperwork is explained and sister will pick pt up. SW explained discharge process to sister and informed her about follow up appointments. Pt will continue to follow with [facility] upon discharge from the hospital, pt has appointments scheduled on 7/26 at 2 PM for an intake appointment.      In order to ensure appropriate transition and discharge planning is in place, the following documents have been transmitted to **, as the new outpatient provider:    The d/c diagnosis was transmitted to the next care provider  The reason for hospitalization was transmitted to the next care provider  The d/c medications (dosage and indication) were transmitted to the next care provider   The continuing care plan was transmitted to the next care provider

## 2022-07-25 NOTE — PROGRESS NOTES
[facility]  Discharge Note    Pt discharged with followings belongings:   Clothing: Pants, Shirt (3 t shirts, 1 sweatshirt, 2 pants)  Other Valuables: Other (Comment) (change)   Valuables sent home with pt. Along with filled prescriptions and safety plan. or returned to patient. Patient education on aftercare instructions: completed, copy given to pt. Information faxed to [facility] by  . .Patient verbalize understanding of AVS:   yes with stated potential to comply to same. .    Status EXAM upon discharge:  Mental Status and Behavioral Exam  Level of Assistance: Independent/Self  Facial Expression: Avoids Gaze  Affect: Blunted  Level of Consciousness: Alert  Frequency of Checks: 4 times per hour, close  Mood:Normal: Yes  Motor Activity:Normal: Yes  Eye Contact: Fair  Observed Behavior: Cooperative  Sexual Misconduct History: Current - no  Preception: [place] to person, [place] to time, [place] to place, [place] to situation  Attention: improved  Thought Processes: Other (comment) (impoverished)  Thought Content: Poverty of content  Depression Symptoms: No problems reported or observed. Anxiety Symptoms: No problems reported or observed. Viviane Symptoms: No problems reported or observed.   Hallucinations: None  Delusions: No  Memory:Normal: Yes  Insight and Judgment: Yes (improved)    Tobacco Screening:  Practical Counseling, on admission, dejon X, if applicable and completed (first 3 are required if patient doesn't refuse):            ( ) Recognizing danger situations (included triggers and roadblocks)                    ( ) Coping skills (new ways to manage stress,relaxation techniques, changing routine, distraction)                                                           ( ) Basic information about quitting (benefits of quitting, techniques in how to quit, available resources  ( ) Referral for counseling faxed to [facility] ( ) Patient refused counseling  ( x) Patient refused referral  ( x) Patient refused prescription upon discharge  ( ) Patient has not smoked in the last 30 days    Metabolic Screening:    Lab Results   Component Value Date    LABA1C 5.0 07/21/2022       Lab Results   Component Value Date    CHOL 187 07/21/2022    CHOL 149 11/30/2016     Lab Results   Component Value Date    TRIG 57 07/21/2022     Lab Results   Component Value Date    HDL 43 07/21/2022     No components found for: [facility] EVALUATION AND TREATMENT [place]  Lab Results   Component Value Date    LABVLDL 11 07/21/2022       Ashley Bates RN

## 2022-07-25 NOTE — GROUP NOTE
Group Therapy Note    Date: 7/25/2022    Group Start Time: 1000  Group End Time: 9945  Group Topic: Cognitive Skills    [facility]    Thankful EYAL Ronquillo, SALVADOR        Group Therapy Note    Attendees:  16       Patient's Goal:  Pt attended [employer]. They also learned about depression - symptoms and treatments available. Notes:  Pt was an active participant in group therapy. They identified their daily goal as \"to be more patient. \"    Status After Intervention:  Improved    Participation Level: Active Listener and Interactive    Participation Quality: Appropriate, Attentive, and Sharing      Speech:  normal      Thought Process/Content: Logical      Affective Functioning: Congruent      Mood: euthymic      Level of consciousness:  Alert, Oriented x4, and Attentive      Response to Learning: Able to verbalize current knowledge/experience, Able to verbalize/acknowledge new learning, and Able to retain information      Endings: None Reported    Modes of Intervention: Education, Support, Socialization, Exploration, Clarifying, and Problem-solving      Discipline Responsible: /Counselor      Signature:   EYAL Burnham LSW

## 2022-07-25 NOTE — DISCHARGE SUMMARY
DISCHARGE SUMMARY      Patient ID:  Shayy Coreas  [id number]  23 y.o.  2000    Admit date: 7/19/2022    Discharge date and time: 7/25/2022    Admitting Physician: Josh Perla MD     Discharge Physician: Dr Chrissy Eaton MD    Discharge Diagnoses:   Patient Active Problem List   Diagnosis    Moderate episode of recurrent major depressive disorder (HCC)    Nausea vomiting and diarrhea    Bipolar 2 disorder, major depressive episode ([facility]    Cluster B personality disorder ([facility] [facility]       Admission Condition: poor    Discharged Condition: stable    Admission Circumstance:  presented to the ED after patient reported to her PCP that she was having daily suicidal thoughts with a plan to complete suicide by overdosing on pills also with a date to complete suicide.       PAST MEDICAL/PSYCHIATRIC HISTORY:   Past Medical History:   Diagnosis Date    Moderate episode of recurrent major depressive disorder ([facility] [address] 11/26/2021       FAMILY/SOCIAL HISTORY:  Family History   Problem Relation Age of Onset    Other Maternal Grandmother         enlarged thyroid    Kidney Disease Maternal Grandfather      Social History     Socioeconomic History    Marital status: Single     Spouse name: Not on file    Number of children: Not on file    Years of education: Not on file    Highest education level: Not on file   Occupational History    Not on file   Tobacco Use    Smoking status: Never     Passive exposure: Yes    Smokeless tobacco: Never   Vaping Use    Vaping Use: Never used   Substance and Sexual Activity    Alcohol use: No    Drug use: No    Sexual activity: Never   Other Topics Concern    Not on file   Social History Narrative    Not on file     Social Determinants of Health     Financial Resource Strain: Low Risk     Difficulty of Paying Living Expenses: Not hard at all   Food Insecurity: Food Insecurity Present    Worried About [address] in the Last Year: Often true    [facility] in the Last Year: Often true Transportation Needs: No Transportation Needs    Lack of Transportation (Medical): No    Lack of Transportation (Non-Medical): No   Physical Activity: Not on file   Stress: Not on file   Social Connections: Not on file   Intimate Partner Violence: Not on file   Housing Stability: Not on file       MEDICATIONS:    Current Facility-Administered Medications:     OXcarbazepine (TRILEPTAL) tablet 300 mg, 300 mg, Oral, BID, Jayjay Colon APRN - CNP, 723 mg at 07/25/22 0859    acetaminophen (TYLENOL) tablet 650 mg, 650 mg, Oral, Q6H PRN, Robin Pinzon MD, 650 mg at 07/24/22 0943    magnesium hydroxide (MILK OF MAGNESIA) 400 MG/5ML suspension 30 mL, 30 mL, Oral, Daily PRN, Robin Pinzon MD    nicotine (NICODERM CQ) 21 MG/24HR 1 patch, 1 patch, TransDERmal, Daily, Robin Pinzon MD    aluminum & magnesium hydroxide-simethicone (MAALOX) 200-200-20 MG/5ML suspension 30 mL, 30 mL, Oral, PRN, Robin Pinzon MD    hydrOXYzine pamoate (VISTARIL) capsule 50 mg, 50 mg, Oral, TID PRN, Robin Pinzon MD, 50 mg at 07/24/22 2120    haloperidol (HALDOL) tablet 5 mg, 5 mg, Oral, Q6H PRN **OR** haloperidol lactate (HALDOL) injection 5 mg, 5 mg, IntraMUSCular, Q6H PRN, Robin Pinzon MD    melatonin tablet 3 mg, 3 mg, Oral, Nightly, Robin Pinzon MD, 3 mg at 07/24/22 2120    citalopram (CELEXA) tablet 10 mg, 10 mg, Oral, Daily, Bandar Holland APRN - CNP, 10 mg at 07/25/22 0859    Examination:  BP (!) 98/56   Pulse 58   Temp 98 °F (36.7 °C)   Resp 14   Ht 5' 7\" (1.702 m)   Wt 197 lb (89.4 kg)   LMP 06/18/2022 (Approximate)   SpO2 99%   BMI 30.85 kg/m²   Gait - steady    HOSPITAL COURSE[de-identified]  Patient was admitted to the unit on 7/19/2022 was closely monitored for psychosis. She was evaluated and treated with Trileptal 300 mg twice daily and Celexa 10 mg daily medical events were insignificant and patient continued to improve on the floor.   She started coming out of her room she was attending groups to socializing with peers. She never made any suicidal statements or any suicidal gestures while in the unit. Social workers obtain confirmation patient's sister who was able to voice any concerns that she had. He reported no safety concerns no access to any guns. Treatment team felt the patient obtain the maximum benefit for her hospitalization She was set up with an outpatient mental health agency for outpatient follow-up services . she has had a voice her future plans spontaneously with richness of detail which includes continuing her job and reaching out to her family when she feels stressed out. At the time of discharge patient  did not show impulsive behavior. She was up on the unit she was attending groups and socializing with peers. She vehemently denied any suicidal homicidal ideations intent or plan. She was eating well and sleeping well there are no neurovegetative signs or symptoms of depression she denied any auditory visualizations. There are no overt or covert signs psychosis. She was appreciative of the help that she received here. This patient no longer meets criteria for inpatient hospitalization. No AVH or paranoid thoughts  No hopeless or worthless feeling  No active SI/HI  Appetite:  [x] Normal  [] Increased  [] Decreased    Sleep:       [x] Normal  [] Fair       [] Poor            Energy:    [x] Normal  [] Increased  [] Decreased     SI [] Present  [x] Absent  HI  []Present  [x] Absent   Aggression:  [] yes  [] no  Patient is [x] able  [] unable to CONTRACT FOR SAFETY   Medication side effects(SE):  [x] None(Psych. Meds.) [] Other      Mental Status Examination on discharge:    Level of consciousness:  within normal limits   Appearance:  well-appearing  Behavior/Motor:  no abnormalities noted  Attitude toward examiner:  attentive and good eye contact  Speech:  spontaneous, normal rate and normal volume   Mood: \" My mood is good. \"  Affect: Bright appropriate and pleasant  Thought processes: Linear without flights of ideas loose associations  Thought content: Devoid of any auditory visualizations delusions or other perceptual normalities. Denies SI/HI intent or plan  Cognition:  oriented to person, place, and time   Concentration intact  Memory intact  Insight good   Judgement fair   Fund of Knowledge adequate      ASSESSMENT:  Patient symptoms are:  [x] Well controlled  [x] Improving  [] Worsening  [] No change    Reason for more than one antipsychotic:  [x] N/A  [] 3 Failed Monotherapy attempts (Drugs tried:)  [] Crossover to a new antipsychotic  [] Taper to Monotherapy from Polypharmacy  [] Augmentation of clozapine therapy due to treatment resistance to single therapy    Diagnosis:  Principal Problem:    Bipolar 2 disorder, major depressive episode ([facility] [address]  Active Problems:    Cluster B personality disorder ([facility] [address]  Resolved Problems:    * No resolved hospital problems. *      LABS:    No results for input(s): WBC, HGB, PLT in the last 72 hours. No results for input(s): NA, K, CL, CO2, BUN, CREATININE, GLUCOSE in the last 72 hours. No results for input(s): BILITOT, ALKPHOS, AST, ALT in the last 72 hours. Lab Results   Component Value Date/Time    LABAMPH NOT DETECTED 07/19/2022 01:13 PM    BARBSCNU NOT DETECTED 07/19/2022 01:13 PM    LABBENZ NOT DETECTED 07/19/2022 01:13 PM    LABMETH NOT DETECTED 07/19/2022 01:13 PM    OPIATESCREENURINE NOT DETECTED 07/19/2022 01:13 PM    PHENCYCLIDINESCREENURINE NOT DETECTED 07/19/2022 01:13 PM    ETOH <10 07/19/2022 01:29 PM     No results found for: TSH, FREET4  No results found for: LITHIUM  No results found for: VALPROATE, CBMZ    RISK ASSESSMENT AT DISCHARGE: Low risk for suicide and homicide. Treatment Plan:  Reviewed current Medications with the patient. Education provided on the complaince with treatment. Risks, benefits, side effects, drug-to-drug interactions and alternatives to treatment were discussed.     Encourage patient to attend

## 2023-08-16 ENCOUNTER — HOSPITAL ENCOUNTER (EMERGENCY)
Age: 23
Discharge: HOME | End: 2023-08-16
Attending: STUDENT IN AN ORGANIZED HEALTH CARE EDUCATION/TRAINING PROGRAM

## 2023-08-16 VITALS
BODY MASS INDEX: 30.53 KG/M2 | HEART RATE: 92 BPM | OXYGEN SATURATION: 99 % | SYSTOLIC BLOOD PRESSURE: 148 MMHG | HEIGHT: 66 IN | WEIGHT: 190 LBS | TEMPERATURE: 97.4 F | DIASTOLIC BLOOD PRESSURE: 98 MMHG | RESPIRATION RATE: 16 BRPM

## 2023-08-16 DIAGNOSIS — R11.2 NAUSEA AND VOMITING, UNSPECIFIED VOMITING TYPE: Primary | ICD-10-CM

## 2023-08-16 LAB
ALBUMIN SERPL-MCNC: 4.7 G/DL (ref 3.5–5.2)
ALP SERPL-CCNC: 69 U/L (ref 35–104)
ALT SERPL-CCNC: 7 U/L (ref 0–32)
ANION GAP SERPL CALCULATED.3IONS-SCNC: 14 MMOL/L (ref 7–16)
AST SERPL-CCNC: 15 U/L (ref 0–31)
BACTERIA URNS QL MICRO: ABNORMAL
BASOPHILS # BLD: 0.03 K/UL (ref 0–0.2)
BASOPHILS NFR BLD: 0 % (ref 0–2)
BILIRUB SERPL-MCNC: 0.5 MG/DL (ref 0–1.2)
BILIRUB UR QL STRIP: ABNORMAL
BUN SERPL-MCNC: 5 MG/DL (ref 6–20)
CALCIUM SERPL-MCNC: 9.6 MG/DL (ref 8.6–10.2)
CHLORIDE SERPL-SCNC: 104 MMOL/L (ref 98–107)
CLARITY UR: CLEAR
CO2 SERPL-SCNC: 21 MMOL/L (ref 22–29)
COLOR UR: YELLOW
CREAT SERPL-MCNC: 0.6 MG/DL (ref 0.5–1)
EOSINOPHIL # BLD: 0.16 K/UL (ref 0.05–0.5)
EOSINOPHILS RELATIVE PERCENT: 2 % (ref 0–6)
ERYTHROCYTE [DISTWIDTH] IN BLOOD BY AUTOMATED COUNT: 12.6 % (ref 11.5–15)
GFR SERPL CREATININE-BSD FRML MDRD: >60 ML/MIN/1.73M2
GLUCOSE SERPL-MCNC: 82 MG/DL (ref 74–99)
GLUCOSE UR STRIP-MCNC: NEGATIVE MG/DL
HCG UR QL: NEGATIVE
HCT VFR BLD AUTO: 44.3 % (ref 34–48)
HGB BLD-MCNC: 14.2 G/DL (ref 11.5–15.5)
HGB UR QL STRIP.AUTO: ABNORMAL
IMM GRANULOCYTES # BLD AUTO: <0.03 K/UL (ref 0–0.58)
IMM GRANULOCYTES NFR BLD: 0 % (ref 0–5)
KETONES UR STRIP-MCNC: >80 MG/DL
LEUKOCYTE ESTERASE UR QL STRIP: NEGATIVE
LIPASE SERPL-CCNC: 31 U/L (ref 13–60)
LYMPHOCYTES NFR BLD: 1.79 K/UL (ref 1.5–4)
LYMPHOCYTES RELATIVE PERCENT: 25 % (ref 20–42)
MCH RBC QN AUTO: 28.1 PG (ref 26–35)
MCHC RBC AUTO-ENTMCNC: 32.1 G/DL (ref 32–34.5)
MCV RBC AUTO: 87.7 FL (ref 80–99.9)
MONOCYTES NFR BLD: 0.54 K/UL (ref 0.1–0.95)
MONOCYTES NFR BLD: 7 % (ref 2–12)
NEUTROPHILS NFR BLD: 65 % (ref 43–80)
NEUTS SEG NFR BLD: 4.76 K/UL (ref 1.8–7.3)
NITRITE UR QL STRIP: NEGATIVE
PH UR STRIP: 6 [PH] (ref 5–9)
PLATELET # BLD AUTO: 308 K/UL (ref 130–450)
PMV BLD AUTO: 10.3 FL (ref 7–12)
POTASSIUM SERPL-SCNC: 3.9 MMOL/L (ref 3.5–5)
PROT SERPL-MCNC: 8 G/DL (ref 6.4–8.3)
PROT UR STRIP-MCNC: ABNORMAL MG/DL
RBC # BLD AUTO: 5.05 M/UL (ref 3.5–5.5)
RBC #/AREA URNS HPF: ABNORMAL /HPF
SODIUM SERPL-SCNC: 139 MMOL/L (ref 132–146)
SP GR UR STRIP: >1.03 (ref 1–1.03)
UROBILINOGEN UR STRIP-ACNC: 0.2 EU/DL (ref 0–1)
WBC #/AREA URNS HPF: ABNORMAL /HPF
WBC OTHER # BLD: 7.3 K/UL (ref 4.5–11.5)

## 2023-08-16 PROCEDURE — 85025 COMPLETE CBC W/AUTO DIFF WBC: CPT

## 2023-08-16 PROCEDURE — 2580000003 HC RX 258

## 2023-08-16 PROCEDURE — 81001 URINALYSIS AUTO W/SCOPE: CPT

## 2023-08-16 PROCEDURE — 2580000003 HC RX 258: Performed by: STUDENT IN AN ORGANIZED HEALTH CARE EDUCATION/TRAINING PROGRAM

## 2023-08-16 PROCEDURE — 80053 COMPREHEN METABOLIC PANEL: CPT

## 2023-08-16 PROCEDURE — 96361 HYDRATE IV INFUSION ADD-ON: CPT

## 2023-08-16 PROCEDURE — 99285 EMERGENCY DEPT VISIT HI MDM: CPT

## 2023-08-16 PROCEDURE — 84703 CHORIONIC GONADOTROPIN ASSAY: CPT

## 2023-08-16 PROCEDURE — 96360 HYDRATION IV INFUSION INIT: CPT

## 2023-08-16 PROCEDURE — 83690 ASSAY OF LIPASE: CPT

## 2023-08-16 RX ORDER — 0.9 % SODIUM CHLORIDE 0.9 %
1000 INTRAVENOUS SOLUTION INTRAVENOUS ONCE
Status: COMPLETED | OUTPATIENT
Start: 2023-08-16 | End: 2023-08-16

## 2023-08-16 RX ADMIN — SODIUM CHLORIDE 1000 ML: 9 INJECTION, SOLUTION INTRAVENOUS at 13:44

## 2023-08-16 RX ADMIN — SODIUM CHLORIDE 1000 ML: 9 INJECTION, SOLUTION INTRAVENOUS at 12:52

## 2023-08-16 NOTE — ED PROVIDER NOTES
Patient was given the following medications:  Medications   sodium chloride 0.9 % bolus 1,000 mL (1,000 mLs IntraVENous New Bag 8/16/23 1344)   sodium chloride 0.9 % bolus 1,000 mL (0 mLs IntraVENous Stopped 8/16/23 1344)       Medical Decision Making/Differential Diagnosis:  CC/HPI Summary, Social Determinants of health, Records Reviewed, DDx, testing done/not done, ED Course, Reassessment, disposition considerations/shared decision making with patient, consults, disposition:      CC/HPI Summary, DDx, ED Course, Reassessment, Tests Considered, Patient expectation:   Ms. Tiburcio Schirmer is a 27 yo female who presents with concerns of nausea and vomiting ongoing for the last 2 days. MDM: Patient presents to the emergency department hemodynamically stable and in no acute distress. She has been having ongoing abdominal pain nausea and vomiting for the last 2 days. LDMP 8/2/2023. No abnormal vaginal discharge. CBC, lipase, UA are all WNL. CMP unremarkable. Urine pregnancy test was negative. Discussed results with patient after 2L NS given. She states that after fluids she is feeling much better and her fatigue is improved. She is relieved at her test results. Her biggest concerns were pregnancy or infection, and she feels comfortable going home with negative test results. Discussed warning signs for returning to the ED. Through shared decision making, patient was comfortable returning home. Differential diagnosis includes but is not limited to: abdominal infection, gastroenteritis, pregnancy, STD    Please refer to the ED Course as available for additional MDM. Social Determinants affecting Dx or Tx: None    Chronic Conditions: Bipolar disorder    Records Reviewed: Previous labs    I am the Primary Clinician of Record. CONSULTS: (Who and What was discussed)  None    FINAL IMPRESSION      1.  Nausea and vomiting, unspecified vomiting type          DISPOSITION/PLAN     DISPOSITION Decision To Discharge

## 2024-02-12 ENCOUNTER — APPOINTMENT (OUTPATIENT)
Dept: ULTRASOUND IMAGING | Age: 24
End: 2024-02-12

## 2024-02-12 ENCOUNTER — HOSPITAL ENCOUNTER (EMERGENCY)
Age: 24
Discharge: HOME | End: 2024-02-13

## 2024-02-12 VITALS
WEIGHT: 200 LBS | DIASTOLIC BLOOD PRESSURE: 82 MMHG | HEIGHT: 66 IN | OXYGEN SATURATION: 99 % | BODY MASS INDEX: 32.14 KG/M2 | SYSTOLIC BLOOD PRESSURE: 146 MMHG | TEMPERATURE: 97.9 F | HEART RATE: 72 BPM | RESPIRATION RATE: 20 BRPM

## 2024-02-12 DIAGNOSIS — R10.2 PELVIC PAIN: Primary | ICD-10-CM

## 2024-02-12 DIAGNOSIS — N93.8 DYSFUNCTIONAL UTERINE BLEEDING: ICD-10-CM

## 2024-02-12 LAB
ALBUMIN SERPL-MCNC: 4.6 G/DL (ref 3.5–5.2)
ALP SERPL-CCNC: 80 U/L (ref 35–104)
ALT SERPL-CCNC: 12 U/L (ref 0–32)
ANION GAP SERPL CALCULATED.3IONS-SCNC: 13 MMOL/L (ref 7–16)
AST SERPL-CCNC: 28 U/L (ref 0–31)
BACTERIA URNS QL MICRO: ABNORMAL
BASOPHILS # BLD: 0.03 K/UL (ref 0–0.2)
BASOPHILS NFR BLD: 0 % (ref 0–2)
BILIRUB SERPL-MCNC: 0.3 MG/DL (ref 0–1.2)
BILIRUB UR QL STRIP: NEGATIVE
BUN SERPL-MCNC: 10 MG/DL (ref 6–20)
CALCIUM SERPL-MCNC: 9.4 MG/DL (ref 8.6–10.2)
CHLORIDE SERPL-SCNC: 105 MMOL/L (ref 98–107)
CLARITY UR: CLEAR
CO2 SERPL-SCNC: 21 MMOL/L (ref 22–29)
COLOR UR: YELLOW
CREAT SERPL-MCNC: 0.7 MG/DL (ref 0.5–1)
CRYSTALS URNS MICRO: ABNORMAL /HPF
EOSINOPHIL # BLD: 0.1 K/UL (ref 0.05–0.5)
EOSINOPHILS RELATIVE PERCENT: 1 % (ref 0–6)
ERYTHROCYTE [DISTWIDTH] IN BLOOD BY AUTOMATED COUNT: 12.3 % (ref 11.5–15)
GFR SERPL CREATININE-BSD FRML MDRD: >60 ML/MIN/1.73M2
GLUCOSE SERPL-MCNC: 96 MG/DL (ref 74–99)
GLUCOSE UR STRIP-MCNC: NEGATIVE MG/DL
HCG, URINE, POC: NEGATIVE
HCT VFR BLD AUTO: 39 % (ref 34–48)
HGB BLD-MCNC: 13.3 G/DL (ref 11.5–15.5)
HGB UR QL STRIP.AUTO: NEGATIVE
IMM GRANULOCYTES # BLD AUTO: <0.03 K/UL (ref 0–0.58)
IMM GRANULOCYTES NFR BLD: 0 % (ref 0–5)
KETONES UR STRIP-MCNC: ABNORMAL MG/DL
LACTATE BLDV-SCNC: 1.3 MMOL/L (ref 0.5–2.2)
LEUKOCYTE ESTERASE UR QL STRIP: ABNORMAL
LIPASE SERPL-CCNC: 50 U/L (ref 13–60)
LYMPHOCYTES NFR BLD: 2.95 K/UL (ref 1.5–4)
LYMPHOCYTES RELATIVE PERCENT: 36 % (ref 20–42)
Lab: NORMAL
MCH RBC QN AUTO: 28.7 PG (ref 26–35)
MCHC RBC AUTO-ENTMCNC: 34.1 G/DL (ref 32–34.5)
MCV RBC AUTO: 84.2 FL (ref 80–99.9)
MONOCYTES NFR BLD: 0.58 K/UL (ref 0.1–0.95)
MONOCYTES NFR BLD: 7 % (ref 2–12)
NEGATIVE QC PASS/FAIL: NORMAL
NEUTROPHILS NFR BLD: 55 % (ref 43–80)
NEUTS SEG NFR BLD: 4.49 K/UL (ref 1.8–7.3)
NITRITE UR QL STRIP: NEGATIVE
PH UR STRIP: 6 [PH] (ref 5–9)
PLATELET # BLD AUTO: 294 K/UL (ref 130–450)
PMV BLD AUTO: 9.7 FL (ref 7–12)
POSITIVE QC PASS/FAIL: NORMAL
POTASSIUM SERPL-SCNC: 4.4 MMOL/L (ref 3.5–5)
PROT SERPL-MCNC: 8.2 G/DL (ref 6.4–8.3)
PROT UR STRIP-MCNC: NEGATIVE MG/DL
RBC # BLD AUTO: 4.63 M/UL (ref 3.5–5.5)
RBC #/AREA URNS HPF: ABNORMAL /HPF
SODIUM SERPL-SCNC: 139 MMOL/L (ref 132–146)
SP GR UR STRIP: >1.03 (ref 1–1.03)
UROBILINOGEN UR STRIP-ACNC: 1 EU/DL (ref 0–1)
WBC #/AREA URNS HPF: ABNORMAL /HPF
WBC OTHER # BLD: 8.2 K/UL (ref 4.5–11.5)

## 2024-02-12 PROCEDURE — 83605 ASSAY OF LACTIC ACID: CPT

## 2024-02-12 PROCEDURE — 81001 URINALYSIS AUTO W/SCOPE: CPT

## 2024-02-12 PROCEDURE — 76830 TRANSVAGINAL US NON-OB: CPT

## 2024-02-12 PROCEDURE — 96374 THER/PROPH/DIAG INJ IV PUSH: CPT

## 2024-02-12 PROCEDURE — 83690 ASSAY OF LIPASE: CPT

## 2024-02-12 PROCEDURE — 80053 COMPREHEN METABOLIC PANEL: CPT

## 2024-02-12 PROCEDURE — 96375 TX/PRO/DX INJ NEW DRUG ADDON: CPT

## 2024-02-12 PROCEDURE — 99284 EMERGENCY DEPT VISIT MOD MDM: CPT

## 2024-02-12 PROCEDURE — 2580000003 HC RX 258: Performed by: NURSE PRACTITIONER

## 2024-02-12 PROCEDURE — 6360000002 HC RX W HCPCS: Performed by: NURSE PRACTITIONER

## 2024-02-12 PROCEDURE — 85025 COMPLETE CBC W/AUTO DIFF WBC: CPT

## 2024-02-12 RX ORDER — KETOROLAC TROMETHAMINE 30 MG/ML
30 INJECTION, SOLUTION INTRAMUSCULAR; INTRAVENOUS ONCE
Status: COMPLETED | OUTPATIENT
Start: 2024-02-12 | End: 2024-02-12

## 2024-02-12 RX ORDER — 0.9 % SODIUM CHLORIDE 0.9 %
1000 INTRAVENOUS SOLUTION INTRAVENOUS ONCE
Status: COMPLETED | OUTPATIENT
Start: 2024-02-12 | End: 2024-02-12

## 2024-02-12 RX ORDER — ONDANSETRON 2 MG/ML
4 INJECTION INTRAMUSCULAR; INTRAVENOUS ONCE
Status: COMPLETED | OUTPATIENT
Start: 2024-02-12 | End: 2024-02-12

## 2024-02-12 RX ADMIN — ONDANSETRON 4 MG: 2 INJECTION INTRAMUSCULAR; INTRAVENOUS at 21:09

## 2024-02-12 RX ADMIN — SODIUM CHLORIDE 1000 ML: 9 INJECTION, SOLUTION INTRAVENOUS at 21:09

## 2024-02-12 RX ADMIN — KETOROLAC TROMETHAMINE 30 MG: 30 INJECTION, SOLUTION INTRAMUSCULAR; INTRAVENOUS at 21:09

## 2024-02-13 NOTE — ED NOTES
Discharge instructions reviewed , including diagnosis, medications, follow up appointments, home care, and also when to call 911.  All discharge instructions questions answered.     IV removed    Pt left ED ambulatory

## 2024-02-13 NOTE — ED PROVIDER NOTES
Independent   HPI:  2/12/24, Time: 8:09 PM NATE Matamoros is a 23 y.o. female presenting to the ED for heavier menstrual cycle than normal when she was on it just days ago but states that it did stop now as well as some lower suprapubic pain.  Patient presents emergency department states that her menstrual cycle was a lot heavier than normal.  She denies any concern for pregnancy denies any concern for STD and otherwise no noted abdominal pain as well as no noted back or flank pain.  She also denies feeling weak or dizzy.    Review of Systems:   A complete review of systems was performed and pertinent positives and negatives are stated within HPI, all other systems reviewed and are negative.          --------------------------------------------- PAST HISTORY ---------------------------------------------  Past Medical History:  has a past medical history of Moderate episode of recurrent major depressive disorder (HCC).    Past Surgical History:  has no past surgical history on file.    Social History:  reports that she has never smoked. She has been exposed to tobacco smoke. She has never used smokeless tobacco. She reports that she does not drink alcohol and does not use drugs.    Family History: family history includes Kidney Disease in her maternal grandfather; Other in her maternal grandmother.     The patient’s home medications have been reviewed.    Allergies: Patient has no known allergies.    -------------------------------------------------- RESULTS -------------------------------------------------  All laboratory and radiology results have been personally reviewed by myself   LABS:  Results for orders placed or performed during the hospital encounter of 02/12/24   Comprehensive Metabolic Panel   Result Value Ref Range    Sodium 139 132 - 146 mmol/L    Potassium 4.4 3.5 - 5.0 mmol/L    Chloride 105 98 - 107 mmol/L    CO2 21 (L) 22 - 29 mmol/L    Anion Gap 13 7 - 16 mmol/L    Glucose 96 74 -

## 2024-03-21 ENCOUNTER — HOSPITAL ENCOUNTER (EMERGENCY)
Age: 24
Discharge: HOME | End: 2024-03-21

## 2024-03-21 VITALS
DIASTOLIC BLOOD PRESSURE: 82 MMHG | HEART RATE: 99 BPM | WEIGHT: 200 LBS | BODY MASS INDEX: 32.14 KG/M2 | SYSTOLIC BLOOD PRESSURE: 142 MMHG | TEMPERATURE: 99 F | HEIGHT: 66 IN | RESPIRATION RATE: 16 BRPM | OXYGEN SATURATION: 98 %

## 2024-03-21 DIAGNOSIS — J10.1 INFLUENZA B: Primary | ICD-10-CM

## 2024-03-21 LAB
FLUAV RNA RESP QL NAA+PROBE: NOT DETECTED
FLUBV RNA RESP QL NAA+PROBE: DETECTED
HCG, URINE, POC: NEGATIVE
Lab: NORMAL
NEGATIVE QC PASS/FAIL: NORMAL
POSITIVE QC PASS/FAIL: NORMAL
SARS-COV-2 RNA RESP QL NAA+PROBE: NOT DETECTED
SOURCE: ABNORMAL
SPECIMEN DESCRIPTION: ABNORMAL

## 2024-03-21 PROCEDURE — 96374 THER/PROPH/DIAG INJ IV PUSH: CPT

## 2024-03-21 PROCEDURE — 2580000003 HC RX 258: Performed by: PHYSICIAN ASSISTANT

## 2024-03-21 PROCEDURE — 99284 EMERGENCY DEPT VISIT MOD MDM: CPT

## 2024-03-21 PROCEDURE — 87636 SARSCOV2 & INF A&B AMP PRB: CPT

## 2024-03-21 PROCEDURE — 6360000002 HC RX W HCPCS: Performed by: PHYSICIAN ASSISTANT

## 2024-03-21 PROCEDURE — 96361 HYDRATE IV INFUSION ADD-ON: CPT

## 2024-03-21 RX ORDER — KETOROLAC TROMETHAMINE 30 MG/ML
30 INJECTION, SOLUTION INTRAMUSCULAR; INTRAVENOUS ONCE
Status: COMPLETED | OUTPATIENT
Start: 2024-03-21 | End: 2024-03-21

## 2024-03-21 RX ORDER — 0.9 % SODIUM CHLORIDE 0.9 %
1000 INTRAVENOUS SOLUTION INTRAVENOUS ONCE
Status: COMPLETED | OUTPATIENT
Start: 2024-03-21 | End: 2024-03-21

## 2024-03-21 RX ADMIN — KETOROLAC TROMETHAMINE 30 MG: 30 INJECTION, SOLUTION INTRAMUSCULAR at 14:01

## 2024-03-21 RX ADMIN — SODIUM CHLORIDE 1000 ML: 9 INJECTION, SOLUTION INTRAVENOUS at 13:59

## 2024-03-21 ASSESSMENT — LIFESTYLE VARIABLES
HOW OFTEN DO YOU HAVE A DRINK CONTAINING ALCOHOL: NEVER
HOW MANY STANDARD DRINKS CONTAINING ALCOHOL DO YOU HAVE ON A TYPICAL DAY: PATIENT DOES NOT DRINK

## 2024-03-21 NOTE — DISCHARGE INSTRUCTIONS
Please drink plenty of fluids 8-10 bottles of water a day.  Please start taking a multivitamin including vitamin C.  Please alternate Tylenol 500 mg with ibuprofen 800 mg every 6-8 hours with food.  Please rest is much as possible work excuse given.  I hope you feel better soon.

## 2024-03-21 NOTE — ED PROVIDER NOTES
there is no real treatment for the flu that is lots of fluids, rest, multivitamins including vitamin C.  Patient was told to eat small consistent meals.  Patient was educated this should pass in the next few days and that she should stay on quarantine for the next 2 to 3 days.  Work excuse will be given.  Patient's vitals improved.  Patient at this time is no evidence of septicemia is vitally stable and ready for outpatient follow-up.    Patient was explicitly instructed on specific signs and symptoms on which to return to the emergency room for. Patient was instructed to return to the ER for any new or worsening symptoms. Additional discharge instructions were given verbally. All questions were answered. Patient is comfortable and agreeable with discharge plan. Patient in no acute distress and non-toxic in appearance.       Plan of Care/Counseling:  Physician Assistant on duty reviewed today's visit with the patient in addition to providing specific details for the plan of care and counseling regarding the diagnosis and prognosis.  Questions are answered at this time and are agreeable with the plan.    ASSESSMENT     1. Influenza B        DISPOSITION   Discharged home.  Patient condition is stable    Discharge Instructions:   Patient referred to  Triny Coh MD  [address] [facility] [address]  [phone]    Schedule an appointment as soon as possible for a visit   For Follow Up    NEW MEDICATIONS     New Prescriptions    No medications on file     Electronically signed by Theresa Evasn PA-C   DD: 3/21/24  **This report was transcribed using voice recognition software. Every effort was made to ensure accuracy; however, inadvertent computerized transcription errors may be present.  END OF ED PROVIDER NOTE          Theresa Evans PA-C  03/21/24 3157

## 2024-11-13 ENCOUNTER — TELEPHONE (OUTPATIENT)
Dept: FAMILY MEDICINE CLINIC | Age: 24
End: 2024-11-13

## 2024-11-13 ENCOUNTER — OFFICE VISIT (OUTPATIENT)
Dept: FAMILY MEDICINE CLINIC | Age: 24
End: 2024-11-13

## 2024-11-13 VITALS
DIASTOLIC BLOOD PRESSURE: 66 MMHG | HEART RATE: 95 BPM | TEMPERATURE: 97.7 F | OXYGEN SATURATION: 98 % | WEIGHT: 217.7 LBS | RESPIRATION RATE: 16 BRPM | SYSTOLIC BLOOD PRESSURE: 130 MMHG | BODY MASS INDEX: 34.99 KG/M2 | HEIGHT: 66 IN

## 2024-11-13 DIAGNOSIS — F31.81 BIPOLAR 2 DISORDER, MAJOR DEPRESSIVE EPISODE (HCC): Primary | ICD-10-CM

## 2024-11-13 DIAGNOSIS — F33.1 MODERATE EPISODE OF RECURRENT MAJOR DEPRESSIVE DISORDER (HCC): ICD-10-CM

## 2024-11-13 DIAGNOSIS — R06.2 WHEEZING: ICD-10-CM

## 2024-11-13 DIAGNOSIS — Z00.00 ROUTINE HEALTH MAINTENANCE: ICD-10-CM

## 2024-11-13 DIAGNOSIS — L50.9 URTICARIA: ICD-10-CM

## 2024-11-13 RX ORDER — ALBUTEROL SULFATE 90 UG/1
2 INHALANT RESPIRATORY (INHALATION) 4 TIMES DAILY PRN
Qty: 18 G | Refills: 5 | Status: SHIPPED | OUTPATIENT
Start: 2024-11-13

## 2024-11-13 SDOH — ECONOMIC STABILITY: FOOD INSECURITY: WITHIN THE PAST 12 MONTHS, THE FOOD YOU BOUGHT JUST DIDN'T LAST AND YOU DIDN'T HAVE MONEY TO GET MORE.: SOMETIMES TRUE

## 2024-11-13 SDOH — ECONOMIC STABILITY: INCOME INSECURITY: HOW HARD IS IT FOR YOU TO PAY FOR THE VERY BASICS LIKE FOOD, HOUSING, MEDICAL CARE, AND HEATING?: SOMEWHAT HARD

## 2024-11-13 SDOH — ECONOMIC STABILITY: FOOD INSECURITY: WITHIN THE PAST 12 MONTHS, YOU WORRIED THAT YOUR FOOD WOULD RUN OUT BEFORE YOU GOT MONEY TO BUY MORE.: SOMETIMES TRUE

## 2024-11-13 ASSESSMENT — PATIENT HEALTH QUESTIONNAIRE - PHQ9
7. TROUBLE CONCENTRATING ON THINGS, SUCH AS READING THE NEWSPAPER OR WATCHING TELEVISION: NOT AT ALL
SUM OF ALL RESPONSES TO PHQ QUESTIONS 1-9: 0
SUM OF ALL RESPONSES TO PHQ9 QUESTIONS 1 & 2: 0
8. MOVING OR SPEAKING SO SLOWLY THAT OTHER PEOPLE COULD HAVE NOTICED. OR THE OPPOSITE, BEING SO FIGETY OR RESTLESS THAT YOU HAVE BEEN MOVING AROUND A LOT MORE THAN USUAL: NOT AT ALL
2. FEELING DOWN, DEPRESSED OR HOPELESS: NOT AT ALL
5. POOR APPETITE OR OVEREATING: NOT AT ALL
SUM OF ALL RESPONSES TO PHQ QUESTIONS 1-9: 0
9. THOUGHTS THAT YOU WOULD BE BETTER OFF DEAD, OR OF HURTING YOURSELF: NOT AT ALL
6. FEELING BAD ABOUT YOURSELF - OR THAT YOU ARE A FAILURE OR HAVE LET YOURSELF OR YOUR FAMILY DOWN: NOT AT ALL
1. LITTLE INTEREST OR PLEASURE IN DOING THINGS: NOT AT ALL
SUM OF ALL RESPONSES TO PHQ QUESTIONS 1-9: 0
SUM OF ALL RESPONSES TO PHQ QUESTIONS 1-9: 0
4. FEELING TIRED OR HAVING LITTLE ENERGY: NOT AT ALL
10. IF YOU CHECKED OFF ANY PROBLEMS, HOW DIFFICULT HAVE THESE PROBLEMS MADE IT FOR YOU TO DO YOUR WORK, TAKE CARE OF THINGS AT HOME, OR GET ALONG WITH OTHER PEOPLE: NOT DIFFICULT AT ALL
3. TROUBLE FALLING OR STAYING ASLEEP: NOT AT ALL

## 2024-11-13 NOTE — TELEPHONE ENCOUNTER
Patient called stating she was just in this morning and Dr. Cho ordered labs that she wanted patient to have done after her appt, however, patient had an emergency come up and was unable to.  Patient asked that I let Dr. Cho know.  I advised her to get them done as soon as she's able to and be sure to fast.  Patient was agreeable.     Last seen 11/13/2024  Next appt 1/14/2025

## 2024-11-13 NOTE — PATIENT INSTRUCTIONS
Please call one of these local counseling agencies to set up an appointment at your convenience    [facility] - [phone] - Kelly Dao Desiree Farragher [facility]  [phone] - [facility] [phone] - Shannan Johnson, Dr. Mayur Asencio    [facility]                   [place] - [phone]. Layla Morales                   [place] - [phone].  [place]    Patient Education        Learning About Being Physically Active  What is physical activity?     Being physically active means doing any kind of activity that gets your body moving.  The types of physical activity that can help you get fit and stay healthy include:  Aerobic or \"cardio\" activities. These make your heart beat faster and make you breathe harder, such as brisk walking, riding a bike, or running. They strengthen your heart and lungs and build up your endurance.  Strength training activities. These make your muscles work against, or \"resist,\" something. Examples include lifting weights or doing push-ups. These activities help tone and strengthen your muscles and bones.  Stretches. These let you move your joints and muscles through their full range of motion. Stretching helps you be more flexible.  Reaching a balance between these three types of physical activity is important because each one contributes to your overall fitness.  What are the benefits of being active?  Being active is one of the best things you can do for your health. It helps you to:  Feel stronger and have more energy to do all the things you like to do.  Focus better at school or work.  Feel, think, and sleep better.  Reach and stay at a healthy weight.  Lose fat and build lean muscle.  Lower your risk for serious health problems, including diabetes, heart attack, high blood pressure, and some cancers.  Keep your heart, lungs, bones, muscles, and joints strong and

## 2024-11-13 NOTE — PROGRESS NOTES
completely resolve, as this could necessitate a change to treatment plans. Patient and/or caregiver expressed understanding.      Indications and proper use of medication(s) reviewed.  Potential side-effects and risks of medication(s) also explained.  Patient and/or caregiver was instructed to call if any new symptoms develop prior to next visit.     Health risk factors discussed and addressed.     Return to Office: Return in about 2 months (around 1/13/2025) for chronic health.    History of Present Illness   The patient is a 24 y.o. female  who presents to the clinic for the following medical concerns:    Bipolar disorder:  Mom is sick so she has been taking care of her. Feels like mentally she is stable right now. However, she stopped all of her medications and is not following with psych. Feels like she has a bad relationship with food and overeats often but will starve herself when she is depressed.     Hives and wheezing:   Usually when she exercises, takes hot showers. Also gets throat swelling sensation. Hives are widespread and resolve within about 20 minutes. Benadryl helps. Has been happening for years. Had asthma as child and still wheezes with exercise. No on inhaler.      Health maintenance:  Declines flu vaccine    Past Medical History:      Diagnosis Date    Moderate episode of recurrent major depressive disorder (HCC) 11/26/2021       Allergies:    Patient has no known allergies.    Social History:   Social History     Tobacco Use    Smoking status: Never     Passive exposure: Yes    Smokeless tobacco: Never   Substance Use Topics    Alcohol use: No        Reviewof Systems:   Review of Systems Negative unless otherwise stated in HPI.    Physical Exam   Vitals: /66   Pulse 95   Temp 97.7 °F (36.5 °C) (Infrared)   Resp 16   Ht 1.676 m (5' 5.98\")   Wt 98.7 kg (217 lb 11.2 oz)   LMP 10/15/2024 (Approximate)   SpO2 98%   BMI 35.15 kg/m²        Physical Exam  Constitutional:

## 2025-01-14 ENCOUNTER — TELEPHONE (OUTPATIENT)
Dept: FAMILY MEDICINE CLINIC | Age: 25
End: 2025-01-14

## 2025-01-21 ENCOUNTER — HOSPITAL ENCOUNTER (EMERGENCY)
Age: 25
Discharge: HOME | End: 2025-01-21
Attending: STUDENT IN AN ORGANIZED HEALTH CARE EDUCATION/TRAINING PROGRAM
Payer: COMMERCIAL

## 2025-01-21 VITALS
DIASTOLIC BLOOD PRESSURE: 82 MMHG | OXYGEN SATURATION: 96 % | SYSTOLIC BLOOD PRESSURE: 132 MMHG | HEART RATE: 103 BPM | RESPIRATION RATE: 16 BRPM | TEMPERATURE: 97.9 F

## 2025-01-21 DIAGNOSIS — F41.9 ANXIETY: ICD-10-CM

## 2025-01-21 DIAGNOSIS — F39 MOOD DISORDER (HCC): Primary | ICD-10-CM

## 2025-01-21 LAB
ALBUMIN SERPL-MCNC: 4.2 G/DL (ref 3.5–5.2)
ALP SERPL-CCNC: 82 U/L (ref 35–104)
ALT SERPL-CCNC: 6 U/L (ref 0–32)
AMPHET UR QL SCN: NEGATIVE
ANION GAP SERPL CALCULATED.3IONS-SCNC: 12 MMOL/L (ref 7–16)
APAP SERPL-MCNC: <5 UG/ML (ref 10–30)
AST SERPL-CCNC: 14 U/L (ref 0–31)
BARBITURATES UR QL SCN: NEGATIVE
BASOPHILS # BLD: 0.02 K/UL (ref 0–0.2)
BASOPHILS NFR BLD: 0 % (ref 0–2)
BENZODIAZ UR QL: NEGATIVE
BILIRUB SERPL-MCNC: 0.3 MG/DL (ref 0–1.2)
BUN SERPL-MCNC: 11 MG/DL (ref 6–20)
BUPRENORPHINE UR QL: NEGATIVE
CALCIUM SERPL-MCNC: 9.1 MG/DL (ref 8.6–10.2)
CANNABINOIDS UR QL SCN: NEGATIVE
CHLORIDE SERPL-SCNC: 106 MMOL/L (ref 98–107)
CO2 SERPL-SCNC: 21 MMOL/L (ref 22–29)
COCAINE UR QL SCN: NEGATIVE
CREAT SERPL-MCNC: 0.7 MG/DL (ref 0.5–1)
EOSINOPHIL # BLD: 0.1 K/UL (ref 0.05–0.5)
EOSINOPHILS RELATIVE PERCENT: 1 % (ref 0–6)
ERYTHROCYTE [DISTWIDTH] IN BLOOD BY AUTOMATED COUNT: 12.1 % (ref 11.5–15)
ETHANOLAMINE SERPL-MCNC: <10 MG/DL (ref 0–0.08)
FENTANYL UR QL: NEGATIVE
GFR, ESTIMATED: >90 ML/MIN/1.73M2
GLUCOSE SERPL-MCNC: 107 MG/DL (ref 74–99)
HCG, URINE, POC: NEGATIVE
HCT VFR BLD AUTO: 40.9 % (ref 34–48)
HGB BLD-MCNC: 13.4 G/DL (ref 11.5–15.5)
IMM GRANULOCYTES # BLD AUTO: <0.03 K/UL (ref 0–0.58)
IMM GRANULOCYTES NFR BLD: 0 % (ref 0–5)
LYMPHOCYTES NFR BLD: 2.25 K/UL (ref 1.5–4)
LYMPHOCYTES RELATIVE PERCENT: 26 % (ref 20–42)
Lab: NORMAL
MAGNESIUM SERPL-MCNC: 1.9 MG/DL (ref 1.6–2.6)
MCH RBC QN AUTO: 28.3 PG (ref 26–35)
MCHC RBC AUTO-ENTMCNC: 32.8 G/DL (ref 32–34.5)
MCV RBC AUTO: 86.5 FL (ref 80–99.9)
METHADONE UR QL: NEGATIVE
MONOCYTES NFR BLD: 0.61 K/UL (ref 0.1–0.95)
MONOCYTES NFR BLD: 7 % (ref 2–12)
NEGATIVE QC PASS/FAIL: NORMAL
NEUTROPHILS NFR BLD: 66 % (ref 43–80)
NEUTS SEG NFR BLD: 5.73 K/UL (ref 1.8–7.3)
OPIATES UR QL SCN: NEGATIVE
OXYCODONE UR QL SCN: NEGATIVE
PCP UR QL SCN: NEGATIVE
PLATELET # BLD AUTO: 315 K/UL (ref 130–450)
PMV BLD AUTO: 9.6 FL (ref 7–12)
POSITIVE QC PASS/FAIL: NORMAL
POTASSIUM SERPL-SCNC: 3.5 MMOL/L (ref 3.5–5)
PROT SERPL-MCNC: 7.7 G/DL (ref 6.4–8.3)
RBC # BLD AUTO: 4.73 M/UL (ref 3.5–5.5)
SALICYLATES SERPL-MCNC: <0.3 MG/DL (ref 0–30)
SODIUM SERPL-SCNC: 139 MMOL/L (ref 132–146)
TEST INFORMATION: NORMAL
TOXIC TRICYCLIC SC,BLOOD: NEGATIVE
WBC OTHER # BLD: 8.7 K/UL (ref 4.5–11.5)

## 2025-01-21 PROCEDURE — 80143 DRUG ASSAY ACETAMINOPHEN: CPT

## 2025-01-21 PROCEDURE — G0480 DRUG TEST DEF 1-7 CLASSES: HCPCS

## 2025-01-21 PROCEDURE — 85025 COMPLETE CBC W/AUTO DIFF WBC: CPT

## 2025-01-21 PROCEDURE — 80179 DRUG ASSAY SALICYLATE: CPT

## 2025-01-21 PROCEDURE — 99284 EMERGENCY DEPT VISIT MOD MDM: CPT

## 2025-01-21 PROCEDURE — 83735 ASSAY OF MAGNESIUM: CPT

## 2025-01-21 PROCEDURE — 93005 ELECTROCARDIOGRAM TRACING: CPT

## 2025-01-21 PROCEDURE — 80053 COMPREHEN METABOLIC PANEL: CPT

## 2025-01-21 PROCEDURE — 80307 DRUG TEST PRSMV CHEM ANLYZR: CPT

## 2025-01-21 PROCEDURE — 90792 PSYCH DIAG EVAL W/MED SRVCS: CPT

## 2025-01-21 RX ORDER — HYDROXYZINE PAMOATE 25 MG/1
25 CAPSULE ORAL 3 TIMES DAILY PRN
Status: DISCONTINUED | OUTPATIENT
Start: 2025-01-21 | End: 2025-01-21

## 2025-01-21 RX ORDER — HYDROXYZINE HYDROCHLORIDE 25 MG/1
25 TABLET, FILM COATED ORAL EVERY 8 HOURS PRN
Qty: 30 TABLET | Refills: 0 | Status: SHIPPED | OUTPATIENT
Start: 2025-01-21 | End: 2025-01-31

## 2025-01-21 ASSESSMENT — PAIN - FUNCTIONAL ASSESSMENT
PAIN_FUNCTIONAL_ASSESSMENT: NONE - DENIES PAIN
PAIN_FUNCTIONAL_ASSESSMENT: NONE - DENIES PAIN

## 2025-01-21 NOTE — ED PROVIDER NOTES
[facility] EMERGENCY DEPARTMENT  EMERGENCY DEPARTMENT ENCOUNTER    Pt Name: Raegan Matamoros  MRN: [id number]  Birthdate 2000  Date of evaluation: 1/21/2025  Provider: Isael Ahumada MD  PCP: Triny Cho MD  Note Started: 4:35 PM EST 1/21/25    HPI     I personally saw Raegan Matamoros and made/approved the management plan and take responsibility for the patient management.  This will serve as my supervisory note and shared attestation.  I did perform a substantiative portion of the visit including all aspects of the medical decision making.    In brief, Raegan Matamoros is a 24 y.o. that presents to the emergency department patient presents for anxiety.  Patient stated that she went to a new therapist at which time she was explaining her suicidal ideation.  Patient states this has been continuous for the past 2 years.  Patient states that it was worse a few months ago but has been getting slightly better.  Patient denies any current plan.  Patient does not take any meds.  Patient denies any drug or alcohol use.  Denies any fevers or chills.  No nausea or vomiting.  Denies any homicidal ideation.  Denies any auditory or visual hallucinations..    Nursing Notes were all reviewed and agreed with or any disagreements were addressed in the HPI.    History From: Patient    Review of Systems   Pertinent positives and negatives as per HPI.     Focused exam:  Physical Exam  Vitals and nursing note reviewed.   Constitutional:       Appearance: She is well-developed.   HENT:      Head: Normocephalic and atraumatic.   Eyes:      Conjunctiva/sclera: Conjunctivae normal.   Cardiovascular:      Rate and Rhythm: Normal rate and regular rhythm.      Heart sounds: Normal heart sounds. No murmur heard.  Pulmonary:      Effort: Pulmonary effort is normal. No respiratory distress.      Breath sounds: Normal breath sounds. No wheezing or rales.   Abdominal:      General: Bowel sounds are normal.

## 2025-01-21 NOTE — VIRTUAL HEALTH
[facility] Consult to Tele-Psych  Consult performed by: Maci Santiago, APRN - CNP  Consult ordered by: Sophie Obando MD  Reason for consult: Suicidal/Risk to Self      Raegan Matamoros  [id number]  2000     EMERGENCY DEPARTMENT TELEPSYCHIATRY EVALUATION    01/21/25    Chief Complaint:  “I was having an anxiety attack.\"    HPI: Patient is a 24 y.o.  female who presents for psychiatric evaluation. Patient presented to the ED on 01/21/25 as a walk-in. History from the ED: \"female with PMHx of bipolar 2 disorder, cluster B personality disorder who presented to the ER on 01/21/25 at [facility] with CC of anxiety.  Patient says that she was having an anxiety attack whenever she came to the ED.  She also says that she had a an appointment today with her therapist through Zoom and she was telling her therapist that over Christmas she was feeling depressed and she had some suicidal thoughts.  The therapist recommended to come to the ED to get evaluated.  Patient says that she has had suicidal thoughts over the years on and off for about 5 years.  She says that she never has had any suicidal attempt.  She said that over a year ago she had made a plan but did not go through with it.  At this time she says she has no plan and she just wants to get help with her therapist and coming to the ED.  Patient denies HI, hallucinations.  Denies smoking, alcohol, or drug use.  She says she used to smoke marijuana but quit.  Patient denies dizziness, shortness of breath, chest pain, abdominal pain, nausea vomiting, dysuria, diarrhea.\" Upon assessment today the patient is alert, oriented, and agreeable to participate in assessment. Patient is seated in bed, calm, and cooperative. Patient states that she had an appointment with a new therapist today and was talking about her history of depression and anxiety, which caused her to have a panic attack. Patient reports having passive SI last month before Christmas,

## 2025-01-21 NOTE — ED PROVIDER NOTES
[facility] EMERGENCY DEPARTMENT  EMERGENCY DEPARTMENT ENCOUNTER        Pt Name: Raegan Matamoros  MRN: [id number]  Birthdate 2000  Date of evaluation: 1/21/2025  Provider: Sophie Obando MD  PCP: Triny Cho MD  Note Started: 4:40 PM EST 1/21/25    CHIEF COMPLAINT       Chief Complaint   Patient presents with    Anxiety     Patient states she feels like she is having an anxiety attack. States she has previously had them.     Psychiatric Evaluation     Patient very guarded and looking around during triage. States her therapist wanted her to be seen and was concerned for her safety.        HISTORY OF PRESENT ILLNESS: 1 or more Elements   History From: PATIENT         Raegan Matamoros is a 24 y.o. female with PMHx of bipolar 2 disorder, cluster B personality disorder who presented to the ER on 01/21/25 at [facility] with CC of anxiety.  Patient says that she was having an anxiety attack whenever she came to the ED.  She also says that she had a an appointment today with her therapist through Zoom and she was telling her therapist that over [place] she was feeling depressed and she had some suicidal thoughts.  The therapist recommended to come to the ED to get evaluated.  Patient says that she has had suicidal thoughts over the years on and off for about 5 years.  She says that she never has had any suicidal attempt.  She said that over a year ago she had made a plan but did not go through with it.  At this time she says she has no plan and she just wants to get help with her therapist and coming to the ED.  Patient denies HI, hallucinations.  Denies smoking, alcohol, or drug use.  She says she used to smoke marijuana but quit.  Patient denies dizziness, shortness of breath, chest pain, abdominal pain, nausea vomiting, dysuria, diarrhea.      Nursing Notes were all reviewed and agreed with or any disagreements were addressed in the HPI.    REVIEW OF SYSTEMS :        Review of

## 2025-01-21 NOTE — DISCHARGE INSTRUCTIONS
Follow up with outpatient provider   [facility]   [address] [id number]   [phone]

## 2025-01-22 LAB
EKG ATRIAL RATE: 78 BPM
EKG P AXIS: 50 DEGREES
EKG P-R INTERVAL: 148 MS
EKG Q-T INTERVAL: 382 MS
EKG QRS DURATION: 74 MS
EKG QTC CALCULATION (BAZETT): 435 MS
EKG R AXIS: 25 DEGREES
EKG T AXIS: 20 DEGREES
EKG VENTRICULAR RATE: 78 BPM

## 2025-01-22 PROCEDURE — 93010 ELECTROCARDIOGRAM REPORT: CPT | Performed by: INTERNAL MEDICINE

## 2025-01-26 PROCEDURE — G0480 DRUG TEST DEF 1-7 CLASSES: HCPCS

## 2025-01-26 PROCEDURE — 85025 COMPLETE CBC W/AUTO DIFF WBC: CPT

## 2025-01-26 PROCEDURE — 80307 DRUG TEST PRSMV CHEM ANLYZR: CPT

## 2025-01-26 PROCEDURE — 80143 DRUG ASSAY ACETAMINOPHEN: CPT

## 2025-01-26 PROCEDURE — 80179 DRUG ASSAY SALICYLATE: CPT

## 2025-01-26 PROCEDURE — 93005 ELECTROCARDIOGRAM TRACING: CPT

## 2025-01-26 PROCEDURE — 80053 COMPREHEN METABOLIC PANEL: CPT

## 2025-01-26 PROCEDURE — 83735 ASSAY OF MAGNESIUM: CPT

## 2025-01-26 PROCEDURE — 99285 EMERGENCY DEPT VISIT HI MDM: CPT

## 2025-01-26 PROCEDURE — 81003 URINALYSIS AUTO W/O SCOPE: CPT

## 2025-01-26 RX ORDER — 0.9 % SODIUM CHLORIDE 0.9 %
1000 INTRAVENOUS SOLUTION INTRAVENOUS ONCE
Status: COMPLETED | OUTPATIENT
Start: 2025-01-26 | End: 2025-01-27

## 2025-01-26 RX ORDER — ONDANSETRON 2 MG/ML
4 INJECTION INTRAMUSCULAR; INTRAVENOUS ONCE
Status: COMPLETED | OUTPATIENT
Start: 2025-01-27 | End: 2025-01-27

## 2025-01-26 ASSESSMENT — PAIN - FUNCTIONAL ASSESSMENT: PAIN_FUNCTIONAL_ASSESSMENT: NONE - DENIES PAIN

## 2025-01-26 ASSESSMENT — LIFESTYLE VARIABLES: HOW OFTEN DO YOU HAVE A DRINK CONTAINING ALCOHOL: NEVER

## 2025-01-27 ENCOUNTER — HOSPITAL ENCOUNTER (INPATIENT)
Age: 25
LOS: 3 days | Discharge: HOME | DRG: 753 | End: 2025-01-30
Attending: STUDENT IN AN ORGANIZED HEALTH CARE EDUCATION/TRAINING PROGRAM | Admitting: PSYCHIATRY & NEUROLOGY
Payer: COMMERCIAL

## 2025-01-27 ENCOUNTER — APPOINTMENT (OUTPATIENT)
Dept: GENERAL RADIOLOGY | Age: 25
DRG: 753 | End: 2025-01-27
Payer: COMMERCIAL

## 2025-01-27 DIAGNOSIS — T50.902A INTENTIONAL OVERDOSE, INITIAL ENCOUNTER (HCC): Primary | ICD-10-CM

## 2025-01-27 LAB
ALBUMIN SERPL-MCNC: 4.5 G/DL (ref 3.5–5.2)
ALP SERPL-CCNC: 85 U/L (ref 35–104)
ALT SERPL-CCNC: 9 U/L (ref 0–32)
AMPHET UR QL SCN: NEGATIVE
ANION GAP SERPL CALCULATED.3IONS-SCNC: 13 MMOL/L (ref 7–16)
APAP SERPL-MCNC: <5 UG/ML (ref 10–30)
AST SERPL-CCNC: 14 U/L (ref 0–31)
BARBITURATES UR QL SCN: NEGATIVE
BASOPHILS # BLD: 0.02 K/UL (ref 0–0.2)
BASOPHILS NFR BLD: 0 % (ref 0–2)
BENZODIAZ UR QL: NEGATIVE
BILIRUB SERPL-MCNC: 0.2 MG/DL (ref 0–1.2)
BILIRUB UR QL STRIP: NEGATIVE
BUN SERPL-MCNC: 8 MG/DL (ref 6–20)
BUPRENORPHINE UR QL: NEGATIVE
CALCIUM SERPL-MCNC: 9.5 MG/DL (ref 8.6–10.2)
CANNABINOIDS UR QL SCN: NEGATIVE
CHLORIDE SERPL-SCNC: 106 MMOL/L (ref 98–107)
CLARITY UR: CLEAR
CO2 SERPL-SCNC: 20 MMOL/L (ref 22–29)
COCAINE UR QL SCN: NEGATIVE
COLOR UR: YELLOW
COMMENT: ABNORMAL
CREAT SERPL-MCNC: 0.7 MG/DL (ref 0.5–1)
EOSINOPHIL # BLD: 0.06 K/UL (ref 0.05–0.5)
EOSINOPHILS RELATIVE PERCENT: 1 % (ref 0–6)
ERYTHROCYTE [DISTWIDTH] IN BLOOD BY AUTOMATED COUNT: 12 % (ref 11.5–15)
ETHANOLAMINE SERPL-MCNC: <10 MG/DL (ref 0–0.08)
FENTANYL UR QL: NEGATIVE
GFR, ESTIMATED: >90 ML/MIN/1.73M2
GLUCOSE SERPL-MCNC: 93 MG/DL (ref 74–99)
GLUCOSE UR STRIP-MCNC: NEGATIVE MG/DL
HCG, URINE, POC: NEGATIVE
HCT VFR BLD AUTO: 43.1 % (ref 34–48)
HGB BLD-MCNC: 14.9 G/DL (ref 11.5–15.5)
HGB UR QL STRIP.AUTO: NEGATIVE
IMM GRANULOCYTES # BLD AUTO: <0.03 K/UL (ref 0–0.58)
IMM GRANULOCYTES NFR BLD: 0 % (ref 0–5)
KETONES UR STRIP-MCNC: ABNORMAL MG/DL
LEUKOCYTE ESTERASE UR QL STRIP: NEGATIVE
LYMPHOCYTES NFR BLD: 2 K/UL (ref 1.5–4)
LYMPHOCYTES RELATIVE PERCENT: 31 % (ref 20–42)
Lab: NORMAL
MAGNESIUM SERPL-MCNC: 1.9 MG/DL (ref 1.6–2.6)
MCH RBC QN AUTO: 29.2 PG (ref 26–35)
MCHC RBC AUTO-ENTMCNC: 34.6 G/DL (ref 32–34.5)
MCV RBC AUTO: 84.3 FL (ref 80–99.9)
METHADONE UR QL: NEGATIVE
MONOCYTES NFR BLD: 0.46 K/UL (ref 0.1–0.95)
MONOCYTES NFR BLD: 7 % (ref 2–12)
NEGATIVE QC PASS/FAIL: NORMAL
NEUTROPHILS NFR BLD: 61 % (ref 43–80)
NEUTS SEG NFR BLD: 4 K/UL (ref 1.8–7.3)
NITRITE UR QL STRIP: NEGATIVE
OPIATES UR QL SCN: NEGATIVE
OXYCODONE UR QL SCN: NEGATIVE
PCP UR QL SCN: NEGATIVE
PH UR STRIP: 6.5 [PH] (ref 5–9)
PLATELET # BLD AUTO: 378 K/UL (ref 130–450)
PMV BLD AUTO: 9.9 FL (ref 7–12)
POSITIVE QC PASS/FAIL: NORMAL
POTASSIUM SERPL-SCNC: 4 MMOL/L (ref 3.5–5)
PROT SERPL-MCNC: 8.3 G/DL (ref 6.4–8.3)
PROT UR STRIP-MCNC: NEGATIVE MG/DL
RBC # BLD AUTO: 5.11 M/UL (ref 3.5–5.5)
SALICYLATES SERPL-MCNC: <0.3 MG/DL (ref 0–30)
SODIUM SERPL-SCNC: 139 MMOL/L (ref 132–146)
SP GR UR STRIP: >1.03 (ref 1–1.03)
TEST INFORMATION: NORMAL
TOXIC TRICYCLIC SC,BLOOD: NEGATIVE
UROBILINOGEN UR STRIP-ACNC: 0.2 EU/DL (ref 0–1)
WBC OTHER # BLD: 6.6 K/UL (ref 4.5–11.5)

## 2025-01-27 PROCEDURE — 90792 PSYCH DIAG EVAL W/MED SRVCS: CPT

## 2025-01-27 PROCEDURE — 71045 X-RAY EXAM CHEST 1 VIEW: CPT

## 2025-01-27 PROCEDURE — 6360000002 HC RX W HCPCS: Performed by: NURSE PRACTITIONER

## 2025-01-27 PROCEDURE — 2580000003 HC RX 258: Performed by: NURSE PRACTITIONER

## 2025-01-27 PROCEDURE — 96374 THER/PROPH/DIAG INJ IV PUSH: CPT

## 2025-01-27 PROCEDURE — 1240000000 HC EMOTIONAL WELLNESS R&B

## 2025-01-27 RX ORDER — ACETAMINOPHEN 325 MG/1
650 TABLET ORAL EVERY 6 HOURS PRN
Status: DISCONTINUED | OUTPATIENT
Start: 2025-01-27 | End: 2025-01-30 | Stop reason: HOSPADM

## 2025-01-27 RX ORDER — HALOPERIDOL 5 MG/1
5 TABLET ORAL EVERY 6 HOURS PRN
Status: DISCONTINUED | OUTPATIENT
Start: 2025-01-27 | End: 2025-01-30 | Stop reason: HOSPADM

## 2025-01-27 RX ORDER — NICOTINE 21 MG/24HR
1 PATCH, TRANSDERMAL 24 HOURS TRANSDERMAL DAILY
Status: DISCONTINUED | OUTPATIENT
Start: 2025-01-27 | End: 2025-01-30 | Stop reason: HOSPADM

## 2025-01-27 RX ORDER — HALOPERIDOL 5 MG/ML
5 INJECTION INTRAMUSCULAR EVERY 6 HOURS PRN
Status: DISCONTINUED | OUTPATIENT
Start: 2025-01-27 | End: 2025-01-30 | Stop reason: HOSPADM

## 2025-01-27 RX ORDER — MAGNESIUM HYDROXIDE/ALUMINUM HYDROXICE/SIMETHICONE 120; 1200; 1200 MG/30ML; MG/30ML; MG/30ML
30 SUSPENSION ORAL PRN
Status: DISCONTINUED | OUTPATIENT
Start: 2025-01-27 | End: 2025-01-30 | Stop reason: HOSPADM

## 2025-01-27 RX ADMIN — ONDANSETRON 4 MG: 2 INJECTION, SOLUTION INTRAMUSCULAR; INTRAVENOUS at 00:49

## 2025-01-27 RX ADMIN — SODIUM CHLORIDE 1000 ML: 9 INJECTION, SOLUTION INTRAVENOUS at 00:53

## 2025-01-27 ASSESSMENT — LIFESTYLE VARIABLES
HOW MANY STANDARD DRINKS CONTAINING ALCOHOL DO YOU HAVE ON A TYPICAL DAY: PATIENT DOES NOT DRINK
HOW OFTEN DO YOU HAVE A DRINK CONTAINING ALCOHOL: NEVER

## 2025-01-27 ASSESSMENT — SLEEP AND FATIGUE QUESTIONNAIRES
DO YOU USE A SLEEP AID: NO
DO YOU HAVE DIFFICULTY SLEEPING: YES
AVERAGE NUMBER OF SLEEP HOURS: 4

## 2025-01-27 NOTE — ED NOTES
Patient's father called but patient declined to speak with him or allow information given.  This was relayed to him and he gave his name and number for patient use if she changes her mind.

## 2025-01-27 NOTE — VIRTUAL HEALTH
Raegan Matamoros  [id number]  2000     EMERGENCY DEPARTMENT TELEPSYCHIATRY EVALUATION    01/27/25    Chief Complaint:  “having a little anxiety attack”  HPI: Patient is a 24 y.o.  female who presents for psychiatric evaluation. Patient presented to the ED on 01/27/25 from home.  Per ED documentation: \"Had increased anxiety today check extra hydroxyzine 25 mg took 10 tablets about an hour ago stated that she was stressed and wanted to calm down.  Stated that she did not want to harm self.\"    Throughout assessment patient's affect was often bright at inappropriate times such as when discussing self injures behaviors or depressive symptoms.  Patient reports presenting to the ED after having a \"little anxiety attack\" adding that her sister brought her to the ED because she was worried.  Patient states that her chest was hurting.  When attempting to ask what was going on prior to the anxiety attack that may have triggered it patient states \"I do not know what was going on\".  When asked why patient took 10 of the medication she states \"I left ongoing notes that it was not dangerous\".  Upon further clarification asking patient if she did this before or after taking the medication she states she did it afterwards, not answering why 10 was taken.  Patient denies having any recent suicidal ideations until asked about the suicidal ideations around [place].  Patient minimizes these ideations and also not forthcoming with the triggering events for that time.  She does state that when she went to go to her mother's some time after [place] his suicidal ideations stopped.  When asking instead if patient has had thoughts of going to sleep and not waking up or no longer being around she states \"a little yell but I do not think about it\".  When attempting to determine aggravating factors patient states \"I am in a stressful environment I feel like I have to take care of everyone and everyone's problems it is

## 2025-01-27 NOTE — BH NOTE
IV catheter on Left Antecubital discontinued with catheter tip intact. Site free from redness, warmth, tenderness, or swelling. Gauze applied with pressure for one minute with no bleeding noted. Dressing applied to site.

## 2025-01-27 NOTE — ED NOTES
Nurse to nurse report given to Rosalva PAREDES on [facility] [address] which includes patient presentation complaint, pink slip, medications, lab results EKG Qtc, and past psych history and admitting orders.

## 2025-01-27 NOTE — BH NOTE
[facility]  Admission Note     Admission Type: Involuntary       Reason for admission:Bipolar 2 disorder Major depressive episode         Addictive Behavior: none       Medical Problems:   Past Medical History:   Diagnosis Date    Moderate episode of recurrent major depressive disorder (HCC) 11/26/2021       Status EXAM:  Mental Status and Behavioral Exam  Normal: No  Level of Assistance: Independent/Self  Facial Expression: Sad  Affect: Appropriate  Level of Consciousness: Alert  Frequency of Checks: 4 times per hour, close  Mood:Normal: No  Mood: Anxious, Sad  Motor Activity:Normal: Yes  Eye Contact: Fair  Observed Behavior: Cooperative  Sexual Misconduct History: Current - no  Preception: Flora to person, Flora to time, Flora to place, Flora to situation  Attention:Normal: Yes  Thought Processes: Circumstantial  Thought Content:Normal: No  Thought Content: Preoccupations  Depression Symptoms: Change in energy level  Anxiety Symptoms: Generalized  Viviane Symptoms: No problems reported or observed.  Hallucinations: None  Delusions: No  Memory:Normal: Yes  Insight and Judgment: No  Insight and Judgment: Poor judgment    Tobacco Screening:  Practical Counseling, on admission, dejon X, if applicable and completed (first 3 are required if patient doesn't refuse)           ( ) Recognizing danger situations (included triggers and roadblocks)                    ( ) Coping skills (new ways to manage stress,relaxation techniques, changing routine, distraction)                                                           ( ) Basic information about quitting (benefits of quitting, techniques in how to quit, available resources  ( ) [facility]                                                                                                                   ( ) Patient refused counseling  ( ) Patient has not smoked in the last 30 days    Metabolic Screening:    Lab

## 2025-01-27 NOTE — ED NOTES
Phone call from Rosy requesting to be called with updates. Pt gave approval to put number in chart and give updates as they become available

## 2025-01-27 NOTE — BH NOTE
4 Eyes Skin Assessment     NAME:  Raegan Matamoros  YOB: 2000  MEDICAL RECORD NUMBER:  [id number]    The patient is being assessed for  Admission    I agree that at least one RN has performed a thorough Head to Toe Skin Assessment on the patient. ALL assessment sites listed below have been assessed.      Areas assessed by both nurses:    Head, Face, Ears, Shoulders, Back, Chest, Arms, Elbows, Hands, Sacrum. Buttock, Coccyx, Ischium, and Legs. Feet and Heels        Does the Patient have a Wound? No noted wound(s)       Thierry Prevention initiated by RN: No  Wound Care Orders initiated by RN: No    Pressure Injury (Stage 3,4, Unstageable, DTI, NWPT, and Complex wounds) if present, place Wound referral order by RN under : No    New Ostomies, if present place, Ostomy referral order under : No     Nurse 1 eSignature: Electronically signed by Leslie Mendoza RN on 1/27/25 at 4:44 PM EST    **SHARE this note so that the co-signing nurse can place an eSignature**    Nurse 2 eSignature: Electronically signed by Rashida Pabon RN on 1/27/25 at 5:12 PM EST

## 2025-01-27 NOTE — ED NOTES
Patient has been recommended for inpatient psych and is medically cleared. Patient can be reviewed for admission to 7W/7S.    Electronically signed by EYAL Lake, SALVADOR on 1/27/2025 at 1:03 PM

## 2025-01-27 NOTE — ED NOTES
Poison control  called update given and patient is medically cleared for admission to psychiatric unit.

## 2025-01-27 NOTE — ED PROVIDER NOTES
ED physician  HPI:  1/26/25, Time: 11:44 PM NATE Matamoros is a 24 y.o. female presenting to the ED for intentional drug ingestion.  Patient presents to the emergency department after purposely taking 10 tablets of hydroxyzine 25 mg 1 hour prior to arrival.  Patient reports that she was stressed.  States multiple issues including financial as well as home issues.  Patient does have a history of depression and bipolar 2 disorder.  Patient noted to be drowsy in triage, she is also having active nausea and vomiting.  Patient somewhat vague with reasoning why she took the 10 tablets in 1 hour.  Patient was actually just seen January 21, 2025 and diagnosed with anxiety and was prescribed 30 tablets of hydroxyzine.  Patient denies any chest pain or shortness of breath or abdominal pain.  Patient reports that she does not work.  States that she lives with her 2 sisters who did bring her in here.      Review of Systems:   A complete review of systems was performed and pertinent positives and negatives are stated within HPI, all other systems reviewed and are negative.          --------------------------------------------- PAST HISTORY ---------------------------------------------  Past Medical History:  has a past medical history of Moderate episode of recurrent major depressive disorder (HCC).    Past Surgical History:  has no past surgical history on file.    Social History:  reports that she has never smoked. She has been exposed to tobacco smoke. She has never used smokeless tobacco. She reports that she does not drink alcohol and does not use drugs.    Family History: family history includes Kidney Disease in her maternal grandfather; Other in her maternal grandmother.     The patient’s home medications have been reviewed.    Allergies: Patient has no known allergies.    -------------------------------------------------- RESULTS -------------------------------------------------  All laboratory and     Neutrophils % 61 43.0 - 80.0 %    Lymphocytes % 31 20.0 - 42.0 %    Monocytes % 7 2.0 - 12.0 %    Eosinophils % 1 0 - 6 %    Basophils % 0 0.0 - 2.0 %    Immature Granulocytes % 0 0.0 - 5.0 %    Neutrophils Absolute 4.00 1.80 - 7.30 k/uL    Lymphocytes Absolute 2.00 1.50 - 4.00 k/uL    Monocytes Absolute 0.46 0.10 - 0.95 k/uL    Eosinophils Absolute 0.06 0.05 - 0.50 k/uL    Basophils Absolute 0.02 0.00 - 0.20 k/uL    Immature Granulocytes Absolute <0.03 0.00 - 0.58 k/uL   Comprehensive Metabolic Panel   Result Value Ref Range    Sodium 139 132 - 146 mmol/L    Potassium 4.0 3.5 - 5.0 mmol/L    Chloride 106 98 - 107 mmol/L    CO2 20 (L) 22 - 29 mmol/L    Anion Gap 13 7 - 16 mmol/L    Glucose 93 74 - 99 mg/dL    BUN 8 6 - 20 mg/dL    Creatinine 0.7 0.50 - 1.00 mg/dL    Est, Glom Filt Rate >90 >60 mL/min/1.73m2    Calcium 9.5 8.6 - 10.2 mg/dL    Total Protein 8.3 6.4 - 8.3 g/dL    Albumin 4.5 3.5 - 5.2 g/dL    Total Bilirubin 0.2 0.0 - 1.2 mg/dL    Alkaline Phosphatase 85 35 - 104 U/L    ALT 9 0 - 32 U/L    AST 14 0 - 31 U/L   Magnesium   Result Value Ref Range    Magnesium 1.9 1.6 - 2.6 mg/dL   POC Pregnancy Urine   Result Value Ref Range    HCG, Urine, POC Negative Negative    Lot Number [id number]     Positive QC Pass/Fail Pass     Negative QC Pass/Fail Pass        RADIOLOGY:  Interpreted by Radiologist.  XR CHEST PORTABLE   Final Result   No acute process.             ------------------------- NURSING NOTES AND VITALS REVIEWED ---------------------------   The nursing notes within the ED encounter and vital signs as below have been reviewed.   BP (!) 152/101   Pulse (!) 121   Temp 99.2 °F (37.3 °C)   Resp 18    1.651 m (5' 5\")   Wt 98.4 kg (217 lb)   LMP 01/12/2025   SpO2 97%   BMI 36.11 kg/m²   Oxygen Saturation Interpretation: Normal      ---------------------------------------------------PHYSICAL EXAM--------------------------------------      Constitutional/General: Alert and oriented x3, drowsy,

## 2025-01-27 NOTE — ED NOTES
Poison Control Recommendations  Pharmacy Note     Date:1/26/25  Medication Toxicity: Hydroxyzine HCL 25mg tablets      HPI: patient ingestion 10, hydroxyzine 25 mg HCL tablets approximately 1 hour prior to arrival.          Current Vitals:  Temp HR BP RR O2   99.2F 121 152/101 18 97% on RA         Acute Ingestion/Toxicity Concerns:      Agent & Amount Ingested    Peak    T1/2 Life Toxicity/  Side Effects as per Poison Control   Additional Recommendations  (I.e. Treatment plan)   Hydroxyzine  mg 2 hours 20 hours Anticholinergic, max adult sing is 100 mg with max of 400 mg/ day   Qtc prolongation Supportive Care  Benzodiazepines for agitation      Additional Labs to Order:  Acetaminophen level  Salicylate level  Ethanol  Pregnancy     Poison Control Recommendations:   Observe for 6-8 hours and back to baseline per Poison Control. Please note, the observation period recommended by Poison Control is only in regards to what the patient had ingested.  Further monitoring should be consider if other co-morbidities are identified or patient presentation declines.     Recommendations discussed with: SONIA Patel- WHITNEY and Dr. Tj Jordan, PharmD BCPS 1/26/2025 11:49 PM  [phone]      Note, this is not a complete list of side effects and treatment options, but documents information provided by the local Poison Control Center for further considerations related to each toxicity. Additional information obtained from [employer] (2024).

## 2025-01-28 PROCEDURE — 6370000000 HC RX 637 (ALT 250 FOR IP)

## 2025-01-28 PROCEDURE — 90792 PSYCH DIAG EVAL W/MED SRVCS: CPT

## 2025-01-28 PROCEDURE — 1240000000 HC EMOTIONAL WELLNESS R&B

## 2025-01-28 PROCEDURE — 6370000000 HC RX 637 (ALT 250 FOR IP): Performed by: PSYCHIATRY & NEUROLOGY

## 2025-01-28 RX ORDER — OXCARBAZEPINE 150 MG/1
150 TABLET, FILM COATED ORAL 2 TIMES DAILY
Status: DISCONTINUED | OUTPATIENT
Start: 2025-01-28 | End: 2025-01-30

## 2025-01-28 RX ORDER — OLANZAPINE 5 MG/1
5 TABLET ORAL NIGHTLY
Status: DISCONTINUED | OUTPATIENT
Start: 2025-01-28 | End: 2025-01-28

## 2025-01-28 RX ORDER — OLANZAPINE 10 MG/1
10 TABLET ORAL NIGHTLY
Status: DISCONTINUED | OUTPATIENT
Start: 2025-01-28 | End: 2025-01-30 | Stop reason: HOSPADM

## 2025-01-28 RX ADMIN — OXCARBAZEPINE 150 MG: 150 TABLET, FILM COATED ORAL at 14:09

## 2025-01-28 RX ADMIN — OXCARBAZEPINE 150 MG: 150 TABLET, FILM COATED ORAL at 21:06

## 2025-01-28 RX ADMIN — OLANZAPINE 10 MG: 10 TABLET, FILM COATED ORAL at 21:06

## 2025-01-28 RX ADMIN — Medication 3 MG: at 21:06

## 2025-01-28 ASSESSMENT — SLEEP AND FATIGUE QUESTIONNAIRES
DO YOU USE A SLEEP AID: NO
SLEEP PATTERN: DIFFICULTY FALLING ASLEEP;DISTURBED/INTERRUPTED SLEEP
DO YOU HAVE DIFFICULTY SLEEPING: YES
AVERAGE NUMBER OF SLEEP HOURS: 4

## 2025-01-28 ASSESSMENT — PAIN SCALES - GENERAL
PAINLEVEL_OUTOF10: 0
PAINLEVEL_OUTOF10: 0

## 2025-01-28 ASSESSMENT — PATIENT HEALTH QUESTIONNAIRE - PHQ9
2. FEELING DOWN, DEPRESSED OR HOPELESS: MORE THAN HALF THE DAYS
6. FEELING BAD ABOUT YOURSELF - OR THAT YOU ARE A FAILURE OR HAVE LET YOURSELF OR YOUR FAMILY DOWN: NOT AT ALL
7. TROUBLE CONCENTRATING ON THINGS, SUCH AS READING THE NEWSPAPER OR WATCHING TELEVISION: NEARLY EVERY DAY
SUM OF ALL RESPONSES TO PHQ QUESTIONS 1-9: 18
3. TROUBLE FALLING OR STAYING ASLEEP: NEARLY EVERY DAY
SUM OF ALL RESPONSES TO PHQ9 QUESTIONS 1 & 2: 4
SUM OF ALL RESPONSES TO PHQ QUESTIONS 1-9: 18
5. POOR APPETITE OR OVEREATING: NEARLY EVERY DAY
SUM OF ALL RESPONSES TO PHQ QUESTIONS 1-9: 18
9. THOUGHTS THAT YOU WOULD BE BETTER OFF DEAD, OR OF HURTING YOURSELF: MORE THAN HALF THE DAYS
8. MOVING OR SPEAKING SO SLOWLY THAT OTHER PEOPLE COULD HAVE NOTICED. OR THE OPPOSITE, BEING SO FIGETY OR RESTLESS THAT YOU HAVE BEEN MOVING AROUND A LOT MORE THAN USUAL: NOT AT ALL
SUM OF ALL RESPONSES TO PHQ QUESTIONS 1-9: 16
10. IF YOU CHECKED OFF ANY PROBLEMS, HOW DIFFICULT HAVE THESE PROBLEMS MADE IT FOR YOU TO DO YOUR WORK, TAKE CARE OF THINGS AT HOME, OR GET ALONG WITH OTHER PEOPLE: VERY DIFFICULT
4. FEELING TIRED OR HAVING LITTLE ENERGY: NEARLY EVERY DAY
1. LITTLE INTEREST OR PLEASURE IN DOING THINGS: MORE THAN HALF THE DAYS

## 2025-01-28 NOTE — PROGRESS NOTES
[facility]  Initial Interdisciplinary Treatment Plan Note      Original treatment plan Date & Time: 01/28/2025 0900    Admission Type:       Reason for admission:        Estimated Length of Stay:  5-7days  Estimated Discharge Date: To be determined by physician.    PATIENT STRENGTHS:  Patient Strengths:   Patient Strengths and Limitations:   Addictive Behavior: Addictive Behavior  In the Past 3 Months, Have You Felt or Has Someone Told You That You Have a Problem With  : None  Medical Problems:  Past Medical History:   Diagnosis Date    Moderate episode of recurrent major depressive disorder (HCC) 11/26/2021     Status EXAM:Mental Status and Behavioral Exam  Normal: No  Level of Assistance: Independent/Self  Facial Expression: Sad  Affect: Congruent  Level of Consciousness: Alert  Frequency of Checks: 4 times per hour, close  Mood:Normal: No  Mood: Anxious, Sad  Motor Activity:Normal: Yes  Motor Activity: Other (comment)  Eye Contact: Fair  Observed Behavior: Cooperative  Sexual Misconduct History: Current - no  Preception: Harris to person, Harris to time, Harris to place, Harris to situation  Attention:Normal: No  Attention: Others (comment)  Thought Processes: Unremarkable  Thought Content:Normal: Yes  Thought Content: Other (comment)  Depression Symptoms: Impaired concentration  Anxiety Symptoms: Generalized  Viviane Symptoms: Poor judgment  Hallucinations: None  Delusions: No  Memory:Normal: Yes  Insight and Judgment: No  Insight and Judgment: Poor judgment, Poor insight    EDUCATION:   Learner Progress Toward Treatment Goals: Will review group plans and strategies for care.    Method: Group therapy, Medication compliance, Individualized assessments and Care planning.    Outcome: Needs Reinforcement    PATIENT GOALS: To be discussed with patient within 72 hours    PLAN/TREATMENT RECOMMENDATIONS:     Continue group therapy , Medications compliance, Goal setting, Individualized assessments and Care

## 2025-01-28 NOTE — GROUP NOTE
Group Therapy Note    Date: 1/28/2025    Group Start Time: 0930  Group End Time: 1000  Group Topic: Cognitive Skills    [facility] ACUTE BH 1    Srinath Ronquillo MSW, LSW        Group Therapy Note    Attendees: 6       Patient's Goal:  Pt attended group therapy where we learned about anxiety - types of, symptoms of, and treatments available.    Notes:  Pt was an active listener in group.    Status After Intervention:  Improved    Participation Level: Active Listener    Participation Quality: Appropriate and Attentive      Speech:  normal      Thought Process/Content: Logical      Affective Functioning: Congruent      Mood: anxious and depressed      Level of consciousness:  Alert, Oriented x4, and Attentive      Response to Learning: Able to verbalize current knowledge/experience and Able to retain information      Endings: None Reported    Modes of Intervention: Education, Support, Socialization, Exploration, Clarifying, and Problem-solving      Discipline Responsible: /Counselor      Signature:  EYAL Lanza, SALVADOR

## 2025-01-28 NOTE — PROGRESS NOTES
Patient declined verbal invitation to the following group    Education- Self-care assessment and self-care tips    Patient will continue to be provided with opportunities to enhance leisure skills/interests and/or coping mechanisms.

## 2025-01-28 NOTE — PLAN OF CARE
Problem: Risk for Elopement  Goal: Patient will not exit the unit/facility without proper excort  1/28/2025 0927 by Tamera Scott RN  Outcome: Progressing     Problem: Anxiety  Goal: Will report anxiety at manageable levels  Description: INTERVENTIONS:  1. Administer medication as ordered  2. Teach and rehearse alternative coping skills  3. Provide emotional support with 1:1 interaction with staff  1/28/2025 0927 by Tamera Scott RN  Outcome: Progressing     Problem: Coping  Goal: Pt/Family able to verbalize concerns and demonstrate effective coping strategies  Description: INTERVENTIONS:  1. Assist patient/family to identify coping skills, available support systems and cultural and spiritual values  2. Provide emotional support, including active listening and acknowledgement of concerns of patient and caregivers  3. Reduce environmental stimuli, as able  4. Instruct patient/family in relaxation techniques, as appropriate  5. Assess for spiritual pain/suffering and initiate Spiritual Care, Psychosocial Clinical Specialist consults as needed  1/28/2025 0927 by Tamera Scott RN  Outcome: Progressing     Problem: Confusion  Goal: Confusion, delirium, dementia, or psychosis is improved or at baseline  Description: INTERVENTIONS:  1. Assess for possible contributors to thought disturbance, including medications, impaired vision or hearing, underlying metabolic abnormalities, dehydration, psychiatric diagnoses, and notify attending LIP  2. [place] high risk fall precautions, as indicated  3. Provide frequent short contacts to provide reality reorientation, refocusing and direction  4. Decrease environmental stimuli, including noise as appropriate  5. Monitor and intervene to maintain adequate nutrition, hydration, elimination, sleep and activity  6. If unable to ensure safety without constant attention obtain sitter and review sitter guidelines with assigned personnel  7. Initiate Psychosocial CNS and Spiritual  Care consult, as indicated  1/28/2025 0927 by Tamera Scott RN  Outcome: Progressing     Problem: Behavior  Goal: Pt/Family maintain appropriate behavior and adhere to behavioral management agreement, if implemented  Description: INTERVENTIONS:  1. Assess patient/family's coping skills and  non-compliant behavior (including use of illegal substances)  2. Notify security of behavior or suspected illegal substances which indicate the need for search of the family and/or belongings  3. Encourage verbalization of thoughts and concerns in a socially appropriate manner  4. Utilize positive, consistent limit setting strategies supporting safety of patient, staff and others  5. Encourage participation in the decision making process about the behavioral management agreement  6. If a visitor's behavior poses a threat to safety call refer to organization policy.  7. Initiate consult with , Psychosocial CNS, Spiritual Care as appropriate  1/28/2025 0927 by Tamera Scott RN  Outcome: Progressing     Problem: Depression/Self Harm  Goal: Effect of psychiatric condition will be minimized and patient will be protected from self harm  Description: INTERVENTIONS:  1. Assess impact of patient's symptoms on level of functioning, self care needs and offer support as indicated  2. Assess patient/family knowledge of depression, impact on illness and need for teaching  3. Provide emotional support, presence and reassurance  4. Assess for possible suicidal thoughts or ideation. If patient expresses suicidal thoughts or statements do not leave alone, initiate Suicide Precautions, move to a room close to the nursing station and obtain sitter  5. Initiate consults as appropriate with Mental Health Professional, Spiritual Care, Psychosocial CNS, and consider a recommendation to the LIP for a Psychiatric Consultation  1/28/2025 0927 by Tamera Scott RN  Outcome: Progressing  Flowsheets (Taken 1/28/2025 0300 by Love Naqvi

## 2025-01-28 NOTE — H&P
Department of Psychiatry  History and Physical - Adult     CHIEF COMPLAINT:    Chief Complaint   Patient presents with    Anxiety     Had increased anxiety today took extra hydroxyzine 25mg took 10 tablets about an hour ago stated that she was stressed and wanted to clam down. Stated that she didn't want to harm self.    Ingestion       Patient was seen after discussing with the treatment team and reviewing the chart    CIRCUMSTANCES OF ADMISSION:     Patient name: Raegan Matamoros  Patient's past mental health and addiction history: History of bipolar 2 disorder, cluster B personality disorder with previous inpatient psychiatric hospitalization  Patient's presentation to the ED and why the patient needs admission: Presented to the ED via overdose suicide attempt reportedly took 10 tablets of hydroxyzine 25 mg  Legal status:  []  Voluntary  [x]  Involuntary  []  Probate  Triggering/precipitating events: Multiple stressors including finances as well as home issues  Duration of triggering/precipitating events: Prior to arrival    HISTORY OF PRESENT ILLNESS:      The patient is a 24 y.o. female with significant past history of bipolar 2 disorder, cluster B personality disorder with previous inpatient psychiatric hospitalizations patient was last here at [facility] in July 2022 presented to the ED after reported suicide attempt patient stated took 10 tablets of 25 mg hydroxyzine, patient was made involuntary status.  Urine drug screen negative, alcohol level negative, QTc 436.  Patient was medically cleared and involuntarily admitted to [facility] for further psychiatric assessment stabilization and treatment.    Upon evaluation today patient was seen in her room she is sitting on her bed she is calm, slightly guarded does appear flat and sad.  She states that she just feels overwhelmed with \"everything going on\" she states that she has financial issues as well as issues at home but does not  hallucinations, delusions or any perceptual abnormalities.  Denies suicidal ideation intent or plan currently.  Denies homicidal ideation intent or plan  Cognition:  oriented to person, place, and time   Concentration intact  Memory intact  Insight limited   Judgement limited   Fund of Knowledge adequate      DIAGNOSIS:    Bipolar 2 disorder, major depressive episode  Cluster B personality disorder      LABS: REVIEWED TODAY:  Recent Labs     01/26/25  2351   WBC 6.6   HGB 14.9        Recent Labs     01/26/25  2351      K 4.0      CO2 20*   BUN 8   CREATININE 0.7   GLUCOSE 93     Recent Labs     01/26/25  2351   BILITOT 0.2   ALKPHOS 85   AST 14   ALT 9     Lab Results   Component Value Date/Time    BARBSCNU NEGATIVE 01/26/2025 11:51 PM    LABBENZ NEGATIVE 01/26/2025 11:51 PM    LABMETH NEGATIVE 01/26/2025 11:51 PM    ETOH <10 01/26/2025 11:51 PM     No results found for: \"TSH\", \"FREET4\"  No results found for: \"LITHIUM\"  No results found for: \"VALPROATE\", \"CBMZ\"  No results found for: \"LITHIUM\", \"VALPROATE\"      Radiology XR CHEST PORTABLE    Result Date: 1/27/2025  EXAMINATION: ONE XRAY VIEW OF THE CHEST 1/27/2025 1:06 am COMPARISON: 06/03/2021. HISTORY: ORDERING SYSTEM PROVIDED HISTORY: Drug overdose TECHNOLOGIST PROVIDED HISTORY: Reason for exam:->Drug overdose FINDINGS: The lungs are without acute focal process.  There is no effusion or pneumothorax. The cardiomediastinal silhouette is without acute process. The osseous structures are without acute process.     No acute process.         TREATMENT PLAN:    Risk Management: Based on the diagnosis and assessment biopsychosocial treatment model was presented to the patient and was given the opportunity to ask any question.  The patient was agreeable to the plan and all the patient's questions were answered to the patient's satisfaction.  I discussed with the patient the risk, benefit, alternative and common side effects for the proposed medication
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
## 2025-01-28 NOTE — CARE COORDINATION
CTRS met with patient to complete leisure assessment.       01/28/25 2462   Activities of Daily Living   Patient Requires assistance with daily self-care activities? No   Leisure Activity 1   3 Favorite Leisure Activities \"sometimes reading, watching movies, hanging out with my mother\"   Frequency weekly   Last time this week   Barriers to participating  motivation;social (ie. no one to do it with)   Social   Patient reports spending the majority of their free time alone  (patient reports she has been isolating more)   Patient verbalizes a preference for spending free time with one other person   Patient’s perception of support system more healthy  (reports her sister and mother is supportive of her care)   Patient’s perception of barriers to socializing with others include(s) lack of motivation/interest;lack of comfort   Social Details patient reports she lives with her sister.  She reports she is single and has no children.  Patient reports she works at a [employer] for her job.  She reports she still feels motivated about her job but less motivation to be around others and participate in leisure interests   Beliefs & Coping   Has difficulty dealing with feelings   Yes   Internalizes feelings/Keeps feelings in Yes  (patient reports she feels like her family is supportive but they do not understand her mental health)   Externalizes feelings through aggressiveness or poor temper control  No   Feels uncomfortable around others  Yes   Has difficulty talking to others  Yes  (difficulty talking to family members as she reports they do not understand)   Depends on others for direction or decisions No   Difficulty dealing with anger of others  No   Difficulty dealing with own anger  No   Difficulty managing stress Yes  (\"recently I just feel like everything is always piling up\")   Frequently has difficulty with relationships  No   Has recently perceived/experienced loss, disappointment, humiliation or failure  Yes  (father

## 2025-01-28 NOTE — CARE COORDINATION
MARTHA contacted pt's mother Nichole [phone] (JORGE signed). No answer, a voicemail was left.     MARTHA contacted [facility] [phone] and scheduled a follow up appointment on 2/3.     UPDATE: SW received a call from pt's mother Nichole [phone] (JORGE signed). Nichole thinks the medications she was given in the ER a few days prior to this led to this admission. She reports the pt wasn't herself after she took the medications and she thinks the medications were for anxiety. Nichole stated it is not like the pt to be on medications or to end up in the hospital. 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     Nichole stated there are no issues with the pt returning home with her sisters at time of discharge. She told her sisters she does not want the pt to be alone until they figure out what is going on with her medications. Nichole confirmed the pt does not have access to any guns or weapons.Nichole has no other concerns for the pt at this time.

## 2025-01-28 NOTE — PLAN OF CARE
functional status, loss of sense of self, and forgiveness   Provide emotional and spiritual support     Problem: Decision Making  Goal: Pt/Family able to effectively weigh alternatives and participate in decision making related to treatment and care  Description: INTERVENTIONS:  1. Determine when there are differences between patient's view, family's view, and healthcare provider's view of condition  2. Facilitate patient and family articulation of goals for care  3. Help patient and family identify pros/cons of alternative solutions  4. Provide information as requested by patient/family  5. Respect patient/family right to receive or not to receive information  6. Serve as a liaison between patient and family and health care team  7. Initiate Consults from Ethics, Palliative Care or initiate Family Care Conference as is appropriate  1/28/2025 0054 by Love Naqvi, RN  Outcome: Progressing  Flowsheets (Taken 1/27/2025 2205)  Patient/family able to effectively weigh alternatives and participate in decision making related to treatment and care: Provide information as requested by patient/family  1/27/2025 1607 by Leslie Mendoza, RN  Outcome: Progressing     Problem: Decision Making  Goal: Pt/Family able to effectively weigh alternatives and participate in decision making related to treatment and care  Description: INTERVENTIONS:  1. Determine when there are differences between patient's view, family's view, and healthcare provider's view of condition  2. Facilitate patient and family articulation of goals for care  3. Help patient and family identify pros/cons of alternative solutions  4. Provide information as requested by patient/family  5. Respect patient/family right to receive or not to receive information  6. Serve as a liaison between patient and family and health care team  7. Initiate Consults from Ethics, Palliative Care or initiate Family Care Conference as is appropriate  1/28/2025 0054 by Driss  explore feelings of anger, bitterness, resentment.  7. Help patient/family identify and examine the situation in which these feelings are experienced.  8. Help patient/family identify destructive displacement of feelings onto other individuals.  9. Invite use of sacraments/rituals/ceremonies as appropriate (e.g. - confession, anointing, smudging).  10. Refer patient/family to formal counseling and/or to [facility] for further support work.  Outcome: Progressing  Flowsheets (Taken 1/27/2025 2205)  Patient/family able to move forward in process of forgiving self, others, and/or higher power: Help patient/family identify and examine the situation in which these feelings are experienced     Problem: Spiritual Care  Goal: Pt/Family able to move forward in process of forgiving self, others, and/or higher power  Description: INTERVENTIONS:  1. Assist patient/family to overcome blocks to healing by use of spiritual practices (prayer, meditation, guided imagery, reiki, breath work, etc).  2. De-myth guilt and help patient/family identify possible irrational spiritual/cultural beliefs and values.  3. Explore possibilities of making amends & reconciliation with self, others, and/or a greater power.  4. Guide patient/family in identifying painful feelings of guilt.  5. Help patient/famiy explore and identify spiritual beliefs, cultural understandings or values that may help or hinder letting go of issue.  6. Help patient/family explore feelings of anger, bitterness, resentment.  7. Help patient/family identify and examine the situation in which these feelings are experienced.  8. Help patient/family identify destructive displacement of feelings onto other individuals.  9. Invite use of sacraments/rituals/ceremonies as appropriate (e.g. - confession, anointing, smudging).  10. Refer patient/family to formal counseling and/or to [facility] for further support work.  Outcome: Progressing  Flowsheets (Taken 1/27/2025

## 2025-01-28 NOTE — BH NOTE
Patient is alert and oriented x 4. Patient has a flat, blunt affect and appears sad, and anxious. Patient is cooperative, however, appears withdrawn, guarded and preoccupied. Patient is self isolative to her room at this time. Patient denies any suicidal ideations, homicidal ideations, auditory and visual hallucinations. Patient denies any depression and anxiety at this time. Patient denies any pain at this time. Patient does not appear in any distress at this time. Fall and standard precautions reviewed and implemented. Will continue to monitor patient every 15 minute rounds to ensure patient safety.

## 2025-01-28 NOTE — GROUP NOTE
Date: 1/28/2025  Start Time: 1500  End Time:  1540  Number of Participants: 7    Type of Group: Recreational    Wellness Binder Information  Module Name:  Old León Estevez    Patient's Goal:  To improve mood and increase overall relaxation    Notes:  CTRS facilitated old wives tales trivia.  Patient was an active participant in activity and was observed socializing with peers and exhibited an overall relaxed behavior.     Status After Intervention:  Improved    Participation Level: Active Listener and Interactive    Participation Quality: Appropriate, Attentive, and Sharing      Speech:  normal      Thought Process/Content: Logical      Affective Functioning: Congruent      Mood: euthymic      Level of consciousness:  Alert and Attentive      Response to Learning: Able to verbalize current knowledge/experience, Able to verbalize/acknowledge new learning, and Progressing to goal      Endings: None Reported    Modes of Intervention: Socialization, Exploration, and Activity      Discipline Responsible: Recreational Therapist      Signature:  ERICA Landry    Group Therapy Note    Attendees: 7

## 2025-01-28 NOTE — GROUP NOTE
Group Therapy Note    Date: 1/28/2025    Group Start Time: 0800  Group End Time: 0830  Group Topic: Community Meeting    [facility] 2    Ryann Tracey CTRS    Group Therapy Note    Date: 1/28/2025  Start Time: 0800  End Time:  0830  Number of Participants: 12    Type of Group: Community Meeting    Wellness Binder Information  Module Name:  Community Meeting      Patient's Goal:  Patient will be oriented to the unit including but not limited expectations, staff, programming schedule.  Patient will be able to ID expectations of treatment.     Notes: Patient was a participant in community meeting, and was updated on expectations of the unit, staffing, programming schedule, and acclimated to their environment.    Patient shared goal for today as \"just get acclimated\"    Status After Intervention:  Unchanged    Participation Level: Active Listener and Interactive    Participation Quality: Appropriate and Attentive      Speech:  normal      Thought Process/Content: Logical      Affective Functioning: Congruent      Mood: anxious      Level of consciousness:  Alert and Attentive      Response to Learning: Able to verbalize current knowledge/experience, Able to verbalize/acknowledge new learning, and Progressing to goal      Endings: None Reported    Modes of Intervention: Exploration, Clarifying, and Problem-solving      Discipline Responsible: Recreational Therapist      Signature:  ERICA Landry

## 2025-01-28 NOTE — PROGRESS NOTES
Spiritual Health History and Assessment/Progress Note  [facility]    Initial Encounter, Behavioral Health,  ,  , (P) Initial Encounter    Name: Raegan Matamoros MRN: [id number]    Age: 24 y.o.     Sex: female   Language: English   Samaritan: None   Bipolar 2 disorder, major depressive episode (HCC)     Date: 1/28/2025                           Spiritual Assessment began in [facility]  2        Referral/Consult From: Nurse   Encounter Overview/Reason: Initial Encounter, Behavioral Health  Service Provided For: Patient    Patient sitting in the common area watching television I introduced myself and asked if I could speak with her, she agreed. I listed as she shared her present situation. Pt reports feeling overwhelmed at this time, she states prior to coming to the hospital she felt stressed with life in general. People expect a lot of her. She has no children but she helps with her 11 nice and nephews. She has strained relationship with her father. She is closer to her mother. Her sister are her support system and her mother. When asked about coping she reports not sure how she elver,. She is not affiliated with a Jainism but is spiritual, we discussed scriptures from the bible and discussed spiritual coping strategies, like reading and prayer. I gave her reading material and prayed with her. She reconciled with God during our meeting. She was appreciative of the visit. Informed pt if she needed to speak with one of the chaplains she could let the nursing staff know and they will contact the spiritual care office.    Judy, Belief, Meaning:   Patient identifies as spiritual and has beliefs or practices that help with coping during difficult times  Family/Friends No family/friends present      Importance and Influence:  Patient has spiritual/personal beliefs that influence decisions regarding their health  Family/Friends No family/friends present    Community:  Patient feels well-supported. Support

## 2025-01-29 PROCEDURE — 6370000000 HC RX 637 (ALT 250 FOR IP)

## 2025-01-29 PROCEDURE — 6370000000 HC RX 637 (ALT 250 FOR IP): Performed by: PSYCHIATRY & NEUROLOGY

## 2025-01-29 PROCEDURE — 1240000000 HC EMOTIONAL WELLNESS R&B

## 2025-01-29 PROCEDURE — 99232 SBSQ HOSP IP/OBS MODERATE 35: CPT

## 2025-01-29 RX ADMIN — Medication 3 MG: at 21:38

## 2025-01-29 RX ADMIN — OLANZAPINE 10 MG: 10 TABLET, FILM COATED ORAL at 21:38

## 2025-01-29 RX ADMIN — OXCARBAZEPINE 150 MG: 150 TABLET, FILM COATED ORAL at 08:34

## 2025-01-29 RX ADMIN — OXCARBAZEPINE 150 MG: 150 TABLET, FILM COATED ORAL at 21:38

## 2025-01-29 ASSESSMENT — PAIN SCALES - GENERAL
PAINLEVEL_OUTOF10: 0
PAINLEVEL_OUTOF10: 0

## 2025-01-29 NOTE — PROGRESS NOTES
Patient declined verbal invitation to the following group    Community Meeting      Patient will continue to be provided with opportunities to enhance leisure skills/interests and/or coping mechanisms.

## 2025-01-29 NOTE — CARE COORDINATION
SW met with pt in the common area to discuss discharge plan. Pt stated that she is doing good today and she will be going home with her sister and her sister will pick her up from the hospital. Pt denied access to any guns/weapons in the home. Pt will continue to treat with [facility] for MH services. Pt denied any depression, anxiety, SI, HI, AVH.

## 2025-01-29 NOTE — PROGRESS NOTES
BEHAVIORAL HEALTH FOLLOW-UP NOTE     1/29/2025     Patient was seen and examined in person, Chart reviewed   Patient's case discussed with staff/team    Chief Complaint: Reported suicide attempt patient reportedly took 10 tablets of hydroxyzine however she states which is because she was having a lot of anxiety    Interim History:   Patient was seen along with medical director in the home she was seen earlier in the day socializing with peers.  She is currently calm, cooperative she does appear flat but brightens with conversation.  She states that her plan on discharge is to go back and stay with her sisters she does state that sometimes this can be stressful situation but she states she knows they are supportive of her.  She denies suicidal ideation, denies homicidal ideation she denies auditory visual hallucinations.  She has been taking her medication, she has had no behavioral disturbances on the unit, she is social and attending group.  Sleep and appetite reported to be fair    Appetite: [x] Normal/Unchanged  [] Increased  [] Decreased      Sleep:       [x] Normal/Unchanged  [] Fair       [] Poor              Energy:    [x] Normal/Unchanged  [] Increased  [] Decreased        SI [] Present  [x] Absent    HI  []Present  [x] Absent     Aggression:  [] yes  [x] no    Patient is [x] able  [] unable to CONTRACT FOR SAFETY     PAST MEDICAL/PSYCHIATRIC HISTORY:   Past Medical History:   Diagnosis Date    Moderate episode of recurrent major depressive disorder (HCC) 11/26/2021       FAMILY/SOCIAL HISTORY:  Family History   Problem Relation Age of Onset    Other Maternal Grandmother         enlarged thyroid    Kidney Disease Maternal Grandfather      Social History     Socioeconomic History    Marital status: Single     Spouse name: Not on file    Number of children: Not on file    Years of education: Not on file    Highest education level: Not on file   Occupational History    Not on file   Tobacco Use    Smoking    BILITOT 0.2   ALKPHOS 85   AST 14   ALT 9     Lab Results   Component Value Date/Time    BARBSCNU NEGATIVE 01/26/2025 11:51 PM    LABBENZ NEGATIVE 01/26/2025 11:51 PM    LABMETH NEGATIVE 01/26/2025 11:51 PM    ETOH <10 01/26/2025 11:51 PM     No results found for: \"TSH\", \"FREET4\"  No results found for: \"LITHIUM\"  No results found for: \"VALPROATE\", \"CBMZ\"        Treatment Plan:  Reviewed current Medications with the patient.   Risks, benefits, side effects, drug-to-drug interactions and alternatives to treatment were discussed.  Collateral information:   CD evaluation  Encourage patient to attend group and other milieu activities.  Discharge planning discussed with the patient and treatment team.    Home medication Reconciled         New Medications started during this admission :    Trileptal 150 mg twice daily  Zyprexa 10 mg nightly     Prn Haldol 5mg and Vistaril 50mg q6hr for extreme agitation.  Melatonin as ordered for insomnia  Vistaril as ordered for anxiety    PSYCHOTHERAPY/COUNSELING:  [x] Therapeutic interview  [x] Supportive  [] CBT  [] Ongoing  [] Other    [x] Patient continues to need, on a daily basis, active treatment furnished directly by or requiring the supervision of inpatient psychiatric personnel      Anticipated Length of stay: 3 to 5 days based on stability        NOTE: This report was transcribed using voice recognition software. Every effort was made to ensure accuracy; however, inadvertent computerized transcription errors may be present.     Electronically signed by SONIA Mercedes CNP on 1/29/2025 at 2:44 PM

## 2025-01-29 NOTE — GROUP NOTE
Date: 1/29/2025  Start Time: 1015  End Time:  1055  Number of Participants: 8    Type of Group: Psychoeducation    Wellness Binder Information  Module Name:  Stop Overthinking!    Patient's Goal:  To help improve decision making skills in daily decisions and life changing decisions.  Patient will be able to ID at least one way to help improve racing thoughts and/or negative thoughts.      Notes:  CTRS led educational discussion on ways to improve decision making skills.  Patient accepted handout and exhibited a receptive behavior.  Patient was an active participant in discussion and able to ID at least one way to improve decision making skills and minimize overthinking.      Status After Intervention:  Improved    Participation Level: Active Listener and Interactive    Participation Quality: Appropriate and Attentive      Speech:  normal      Thought Process/Content: Logical      Affective Functioning: Congruent      Mood: anxious      Level of consciousness:  Alert and Attentive      Response to Learning: Able to verbalize current knowledge/experience, Able to verbalize/acknowledge new learning, and Progressing to goal      Endings: None Reported    Modes of Intervention: Education, Exploration, Clarifying, and Problem-solving      Discipline Responsible: Recreational Therapist      Signature:  ERICA Landry

## 2025-01-29 NOTE — PLAN OF CARE
Problem: Anxiety  Goal: Will report anxiety at manageable levels  Description: INTERVENTIONS:  1. Administer medication as ordered  2. Teach and rehearse alternative coping skills  3. Provide emotional support with 1:1 interaction with staff  1/28/2025 2219 by Yaenli Lorenzana RN  Outcome: Progressing     Problem: Behavior  Goal: Pt/Family maintain appropriate behavior and adhere to behavioral management agreement, if implemented  Description: INTERVENTIONS:  1. Assess patient/family's coping skills and  non-compliant behavior (including use of illegal substances)  2. Notify security of behavior or suspected illegal substances which indicate the need for search of the family and/or belongings  3. Encourage verbalization of thoughts and concerns in a socially appropriate manner  4. Utilize positive, consistent limit setting strategies supporting safety of patient, staff and others  5. Encourage participation in the decision making process about the behavioral management agreement  6. If a visitor's behavior poses a threat to safety call refer to organization policy.  7. Initiate consult with , Psychosocial CNS, Spiritual Care as appropriate  1/28/2025 2219 by Yaneli Lorenzana RN  Outcome: Progressing     Problem: Depression/Self Harm  Goal: Effect of psychiatric condition will be minimized and patient will be protected from self harm  Description: INTERVENTIONS:  1. Assess impact of patient's symptoms on level of functioning, self care needs and offer support as indicated  2. Assess patient/family knowledge of depression, impact on illness and need for teaching  3. Provide emotional support, presence and reassurance  4. Assess for possible suicidal thoughts or ideation. If patient expresses suicidal thoughts or statements do not leave alone, initiate Suicide Precautions, move to a room close to the nursing station and obtain sitter  5. Initiate consults as appropriate with Mental Health

## 2025-01-29 NOTE — PLAN OF CARE
Problem: Risk for Elopement  Goal: Patient will not exit the unit/facility without proper excort  Outcome: Progressing     Problem: Anxiety  Goal: Will report anxiety at manageable levels  Description: INTERVENTIONS:  1. Administer medication as ordered  2. Teach and rehearse alternative coping skills  3. Provide emotional support with 1:1 interaction with staff  1/29/2025 0852 by Tamera Scott RN  Outcome: Progressing     Problem: Coping  Goal: Pt/Family able to verbalize concerns and demonstrate effective coping strategies  Description: INTERVENTIONS:  1. Assist patient/family to identify coping skills, available support systems and cultural and spiritual values  2. Provide emotional support, including active listening and acknowledgement of concerns of patient and caregivers  3. Reduce environmental stimuli, as able  4. Instruct patient/family in relaxation techniques, as appropriate  5. Assess for spiritual pain/suffering and initiate Spiritual Care, Psychosocial Clinical Specialist consults as needed  Outcome: Progressing     Problem: Behavior  Goal: Pt/Family maintain appropriate behavior and adhere to behavioral management agreement, if implemented  Description: INTERVENTIONS:  1. Assess patient/family's coping skills and  non-compliant behavior (including use of illegal substances)  2. Notify security of behavior or suspected illegal substances which indicate the need for search of the family and/or belongings  3. Encourage verbalization of thoughts and concerns in a socially appropriate manner  4. Utilize positive, consistent limit setting strategies supporting safety of patient, staff and others  5. Encourage participation in the decision making process about the behavioral management agreement  6. If a visitor's behavior poses a threat to safety call refer to organization policy.  7. Initiate consult with , Psychosocial CNS, Spiritual Care as appropriate  1/29/2025 0852 by Tamera Scott

## 2025-01-30 VITALS
TEMPERATURE: 97.4 F | SYSTOLIC BLOOD PRESSURE: 118 MMHG | BODY MASS INDEX: 36.15 KG/M2 | WEIGHT: 217 LBS | DIASTOLIC BLOOD PRESSURE: 71 MMHG | RESPIRATION RATE: 18 BRPM | OXYGEN SATURATION: 99 % | HEIGHT: 65 IN | HEART RATE: 82 BPM

## 2025-01-30 LAB
EKG ATRIAL RATE: 113 BPM
EKG P AXIS: 146 DEGREES
EKG P-R INTERVAL: 130 MS
EKG Q-T INTERVAL: 318 MS
EKG QRS DURATION: 70 MS
EKG QTC CALCULATION (BAZETT): 436 MS
EKG R AXIS: 27 DEGREES
EKG T AXIS: 14 DEGREES
EKG VENTRICULAR RATE: 113 BPM

## 2025-01-30 PROCEDURE — 99239 HOSP IP/OBS DSCHRG MGMT >30: CPT

## 2025-01-30 PROCEDURE — 6370000000 HC RX 637 (ALT 250 FOR IP)

## 2025-01-30 RX ORDER — OXCARBAZEPINE 300 MG/1
300 TABLET, FILM COATED ORAL 2 TIMES DAILY
Qty: 60 TABLET | Refills: 0 | Status: SHIPPED | OUTPATIENT
Start: 2025-01-30 | End: 2025-03-01

## 2025-01-30 RX ORDER — OLANZAPINE 10 MG/1
10 TABLET ORAL NIGHTLY
Qty: 30 TABLET | Refills: 0 | Status: SHIPPED | OUTPATIENT
Start: 2025-01-30 | End: 2025-03-01

## 2025-01-30 RX ORDER — NICOTINE 21 MG/24HR
1 PATCH, TRANSDERMAL 24 HOURS TRANSDERMAL DAILY
COMMUNITY
Start: 2025-01-31

## 2025-01-30 RX ORDER — OXCARBAZEPINE 300 MG/1
300 TABLET, FILM COATED ORAL 2 TIMES DAILY
Status: DISCONTINUED | OUTPATIENT
Start: 2025-01-30 | End: 2025-01-30 | Stop reason: HOSPADM

## 2025-01-30 RX ADMIN — OXCARBAZEPINE 150 MG: 150 TABLET, FILM COATED ORAL at 08:39

## 2025-01-30 NOTE — PLAN OF CARE
Problem: Anxiety  Goal: Will report anxiety at manageable levels  Description: INTERVENTIONS:  1. Administer medication as ordered  2. Teach and rehearse alternative coping skills  3. Provide emotional support with 1:1 interaction with staff  Outcome: Progressing     Problem: Coping  Goal: Pt/Family able to verbalize concerns and demonstrate effective coping strategies  Description: INTERVENTIONS:  1. Assist patient/family to identify coping skills, available support systems and cultural and spiritual values  2. Provide emotional support, including active listening and acknowledgement of concerns of patient and caregivers  3. Reduce environmental stimuli, as able  4. Instruct patient/family in relaxation techniques, as appropriate  5. Assess for spiritual pain/suffering and initiate Spiritual Care, Psychosocial Clinical Specialist consults as needed  Outcome: Progressing     Problem: Decision Making  Goal: Pt/Family able to effectively weigh alternatives and participate in decision making related to treatment and care  Description: INTERVENTIONS:  1. Determine when there are differences between patient's view, family's view, and healthcare provider's view of condition  2. Facilitate patient and family articulation of goals for care  3. Help patient and family identify pros/cons of alternative solutions  4. Provide information as requested by patient/family  5. Respect patient/family right to receive or not to receive information  6. Serve as a liaison between patient and family and health care team  7. Initiate Consults from Ethics, Palliative Care or initiate Family Care Conference as is appropriate  Outcome: Progressing     Problem: Depression/Self Harm  Goal: Effect of psychiatric condition will be minimized and patient will be protected from self harm  Description: INTERVENTIONS:  1. Assess impact of patient's symptoms on level of functioning, self care needs and offer support as indicated  2. Assess  patient/family knowledge of depression, impact on illness and need for teaching  3. Provide emotional support, presence and reassurance  4. Assess for possible suicidal thoughts or ideation. If patient expresses suicidal thoughts or statements do not leave alone, initiate Suicide Precautions, move to a room close to the nursing station and obtain sitter  5. Initiate consults as appropriate with Mental Health Professional, Spiritual Care, Psychosocial CNS, and consider a recommendation to the LIP for a Psychiatric Consultation  Outcome: Progressing     Patient denies suicidal ideation, homicidal ideations and hallucinations. Patient is flat, sad, and guarded on approach but brightens slightly with conversation. Presents calm and cooperative during assessment. Denies any anxiety or depression at this time. Patient is seen out on the unit, social with her roommate. Medications taken without issue. No complaints or concerns verbalized at this time. No unit problems reported. Will continue to observe and support.

## 2025-01-30 NOTE — GROUP NOTE
Group Therapy Note    Date: 1/30/2025    Group Start Time: 0945  Group End Time: 1020  Group Topic: Cognitive Skills    [facility]    Srinath Ronquillo, EYAL, SALVADOR        Group Therapy Note    Attendees: 8       Patient's Goal:  Pt attended group therapy where we learned about emotion regulation and having a growth mindset.    Notes:  Pt was an active participant in group therapy.     Status After Intervention:  Improved    Participation Level: Active Listener and Interactive    Participation Quality: Appropriate, Attentive, and Sharing      Speech:  normal      Thought Process/Content: Logical      Affective Functioning: Congruent      Mood: euthymic      Level of consciousness:  Alert, Oriented x4, and Attentive      Response to Learning: Able to verbalize current knowledge/experience, Able to verbalize/acknowledge new learning, and Able to retain information      Endings: None Reported    Modes of Intervention: Education, Support, Socialization, Exploration, Clarifying, and Problem-solving      Discipline Responsible: /Counselor      Signature:  EYAL Lanza, SALVADOR

## 2025-01-30 NOTE — PROGRESS NOTES
CLINICAL PHARMACY NOTE: MEDS TO BEDS    Total # of Prescriptions Filled: 2   The following medications were delivered to the patient:  Olanzapine 10 mg  Oxcarbazepine 300 mg    Additional Documentation:     Ritika KELLEY, picked meds up in the pharmacy

## 2025-01-30 NOTE — CARE COORDINATION
SW met with pt to discuss discharge for today. Pt found in common area. Pt is calm and cooperative with fair eye contact. She is somewhat anxious, affect is appropriate. Pt states that she is feeling better and feels ready to discharge home with her sister today. Pt denied SI/HI/hallucinations. Pt states that she will follow up with [facility] after discharge. She states that she is looking forward to seeing her kids at her  that she works at. She plans to travel to [place] and [place] in the future and also expressed plans to go to college for social work with a goal of helping children in the future. Pt denied questions or concerns for SW at this time, states that sister will transport her home.     SW called pt sister Rosy [phone] (JORGE signed). She states that pt will stay with her at discharge and denied pt access to guns/weapons. She will transport pt home today. She states that she visited with pt yesterday and it went well. She states that pt seems to be doing good and denied concerns for pt, feels pt is ready to discharge home today.     In order to ensure appropriate transition and discharge planning is in place, the following documents have been transmitted to [facility], as the new outpatient provider:    The d/c diagnosis was transmitted to the next care provider  The reason for hospitalization was transmitted to the next care provider  The d/c medications (dosage and indication) were transmitted to the next care provider   The continuing care plan was transmitted to the next care provider

## 2025-01-30 NOTE — TRANSITION OF CARE
Behavioral Health Transition Record    Patient Name: Raegan Matamoros  YOB: 2000   Medical Record Number: [id number]  Date of Admission: 1/27/2025 12:21 AM   Date of Discharge: 1/30/2025    Attending Provider: Benedict Newberry MD   Discharging Provider: Benedict Newberry MD     To contact this individual call [phone] and ask the  to page.  If unavailable, ask to be transferred to Behavioral Health Provider on call.  A Behavioral Health Provider will be available on call 24/7 and during holidays.    Primary Care Provider: Triny Cho MD    No Known Allergies    Reason for Admission: Patient name: Raegan Matamoros  Patient's past mental health and addiction history: History of bipolar 2 disorder, cluster B personality disorder with previous inpatient psychiatric hospitalization  Patient's presentation to the ED and why the patient needs admission: Presented to the ED via overdose suicide attempt reportedly took 10 tablets of hydroxyzine 25 mg  Legal status:  []  Voluntary  [x]  Involuntary  []  Probate  Triggering/precipitating events: Multiple stressors including finances as well as home issues  Duration of triggering/precipitating events: One day ago    Admission Diagnosis: Bipolar 2 disorder, major depressive episode ([facility]) [F31.81]  Intentional overdose, initial encounter ([facility]) [T50.702A]    * No surgery found *    Results for orders placed or performed during the hospital encounter of 01/27/25   Urinalysis   Result Value Ref Range    Color, UA Yellow Yellow    Turbidity UA Clear Clear    Glucose, Ur NEGATIVE NEGATIVE mg/dL    Bilirubin, Urine NEGATIVE NEGATIVE    Ketones, Urine TRACE (A) NEGATIVE mg/dL    Specific Gravity, UA >1.030 (H) 1.005 - 1.030    Urine Hgb NEGATIVE NEGATIVE    pH, Urine 6.5 5.0 - 9.0    Protein, UA NEGATIVE NEGATIVE mg/dL    Urobilinogen, Urine 0.2 0.0 - 1.0 EU/dL    Nitrite, Urine NEGATIVE NEGATIVE    Leukocyte Esterase, Urine NEGATIVE NEGATIVE    Comment    (age 6w+), SC/IM, 0.5mL 2002, 01/29/2003, 04/09/2003, 09/25/2006    TDaP, ADACEL (age 10y-64y), BOOSTRIX (age 10y+), IM, 0.5mL 10/28/2015    Varicella, VARIVAX, (age 12m+), SC, 0.5mL 09/25/2006     Influenza Vaccination Status: Documentation of patient's or caregiver's refusal of influenza vaccine.    Screening for Metabolic Disorders for Patients on Antipsychotic Medications  (Data obtained from the EMR)    Estimated Body Mass Index  Body mass index is 36.11 kg/m².      Vital Signs/Blood Pressure  /71   Pulse 82   Temp 97.4 °F (36.3 °C) (Temporal)   Resp 18   Ht 1.651 m (5' 5\")   Wt 98.4 kg (217 lb)   LMP 01/12/2025   SpO2 99%   BMI 36.11 kg/m²      Fasting Blood Glucose or Hemoglobin A1c  No results found for: \"GLU\", \"GLUCPOC\"    Hemoglobin A1C   Date Value Ref Range Status   07/21/2022 5.0 4.0 - 5.6 % Final       Discharge Diagnosis: Bipolar 2 disorder, major depressive episode ([facility]) Cluster B personality disorder ([facility])     Discharge Plan/Destination: Home    Discharge Medication List and Instructions:      Medication List        START taking these medications      nicotine 21 MG/24HR  Commonly known as: NICODERM CQ  Place 1 patch onto the skin daily  Start taking on: January 31, 2025     OLANZapine 10 MG tablet  Commonly known as: ZYPREXA  Take 1 tablet by mouth nightly     OXcarbazepine 300 MG tablet  Commonly known as: TRILEPTAL  Take 1 tablet by mouth 2 times daily            STOP taking these medications      albuterol sulfate  (90 Base) MCG/ACT inhaler  Commonly known as: Ventolin HFA     hydrOXYzine HCl 25 MG tablet  Commonly known as: ATARAX               Where to Get Your Medications        These medications were sent to [facility] [place] [address] [address] - P [phone] - F [phone]  [facility], [facility] [address]      Phone: [phone]   OLANZapine 10 MG tablet  OXcarbazepine 300 MG tablet       You can get these medications from any

## 2025-01-30 NOTE — GROUP NOTE
Group Therapy Note    Date: 1/30/2025    Group Start Time: 1425  Group End Time: 1500  Group Topic: Psychoeducation    [facility] 1    Allie Escalante, CTRS    Date: 1/30/2025  Module Name:  processing big changes    Patient's Goal:   will be able to id what key points one can focus on when life   Feels like all thing are out of your control.     Notes:  pleasant and engaged in group, sharing and accepting of handout.     Status After Intervention:  Improved    Participation Level: Active Listener and Interactive    Participation Quality: Appropriate, Attentive, and Sharing      Speech:  normal      Thought Process/Content: Logical      Affective Functioning: Congruent      Mood: euthymic      Level of consciousness:  Alert, Oriented x4, and Attentive      Response to Learning: Able to verbalize/acknowledge new learning, Able to retain information, and Progressing to goal      Endings: None Reported    Modes of Intervention: Education, Support, Socialization, and Clarifying      Discipline Responsible: Psychoeducational Specialist      Signature:  ERICA Rico

## 2025-01-30 NOTE — PROGRESS NOTES
[facility] [place]  Day 3 Interdisciplinary Treatment Plan NOTE    Review Date & Time: 1/30/2025 0900    Patient was not in treatment team    Estimated Length of Stay Update:  1-3 days   Estimated Discharge Date Update: 2/2/2025    EDUCATION:   Learner Progress Toward Treatment Goals: Reviewed results and recommendations of this team, Reviewed group plan and strategies, Reviewed signs, symptoms and risk of self harm and violent behavior, and Reviewed goals and plan of care    Method: Small group    Outcome: Verbalized understanding    PATIENT GOALS: \"Sleep all day\"    PLAN/TREATMENT RECOMMENDATIONS UPDATE:Encourage patient to attend and participate in groups and take medications as prescribed.     GOALS UPDATE:   Time frame for Short-Term Goals: Prior to discharge      Indigo Chandler RN

## 2025-01-30 NOTE — DISCHARGE INSTRUCTIONS
[facility] [place] [facility]   [address] [place] [id number]    Phone: [phone]   Fax [phone]     [facility]   [address] [address]   Phone: [phone] [address]   Fax: [phone]     [facility]   [address] [address]   Phone: [phone]   Fax: [phone]     [facility]- children only (18 and younger)  [address] [address]   Phone: [phone]   Fax: [phone]     [facility]   [address] [address]   Phone: [phone]   Fax: [phone]     [facility]   [address] [address]   Phone: [phone]   Fax: [phone]     [place] [address]   Phone: [phone]   Fax: [phone]     [facility]   [address] [place] [address]    Phone: [phone]   Fax: [phone]     [facility]   Address: [address] [place] [address]   Phone: [phone]   Fax: [phone]     [facility]   [address] [address]   Phone: [phone]   Fax: [phone]   Please enter at the main entrance and go down the winkler to elevator B, go up to the 7th floor, check in at the first door in the left hallway.     [facility]   [address] [place] [address]   Phone: [phone]   Fax: [phone]     [facility]   [address] [place] [address]   Phone: [phone]   Fax: [phone]     [facility] only)   [address] [address]   Phone: [phone]   Fax: [phone]     [facility]   [address] [facility] [address], [place] [address]   Phone: [phone]   Fax:

## 2025-01-30 NOTE — PROGRESS NOTES
[facility]  Discharge Note    Pt discharged with followings belongings:   Dental Appliances: None  Vision - Corrective Lenses: None, Eyeglasses (at home are glasses)  Hearing Aid: None  Jewelry: None  Body Piercings Removed: No  Clothing: Footwear, Pants, Shirt (1 braulio witer coat,1 bright pink night cap, black pants with strings, 1 dark olive green T-shirt,1 pr sock with design, 1 sport bra)  Other Valuables: Purse     Valuables sent home with or returned to patient.   Patient educated on aftercare instructions: Yes    Information faxed to n/a by n/a  at 12:59 PM.  Patient verbalize understanding of AVS:  Yes.      Status EXAM upon discharge:  Mental Status and Behavioral Exam  Normal: No  Level of Assistance: Independent/Self  Facial Expression: Flat  Affect: Constricted  Level of Consciousness: Alert  Frequency of Checks: 4 times per hour, close  Mood:Normal: Yes  Mood: Depressed, Sad  Motor Activity:Normal: Yes  Motor Activity: Other (comment)  Eye Contact: Good  Observed Behavior: Cooperative, Friendly  Sexual Misconduct History: Current - no  Preception: [place] to person, [place] to time, [place] to place, [place] to situation  Attention:Normal: No  Attention: Distractible  Thought Processes: Unremarkable  Thought Content:Normal: Yes  Thought Content: Other (comment)  Depression Symptoms: No problems reported or observed.  Anxiety Symptoms: No problems reported or observed.  Viviane Symptoms: No problems reported or observed.  Hallucinations: None  Delusions: No  Memory:Normal: Yes  Insight and Judgment: Yes  Insight and Judgment: Poor judgment, Poor insight    Tobacco Screening:  Practical Counseling, on admission, dejon X, if applicable and completed (first 3 are required if patient doesn't refuse):            (X) Recognizing danger situations (included triggers and roadblocks)                    (X) Coping skills (new ways to manage stress,relaxation techniques, changing routine, distraction)

## 2025-01-30 NOTE — GROUP NOTE
Shared goal for the day as to sleep all day.                                                                       Group Therapy Note    Date: 1/30/2025    Group Start Time: 0900  Group End Time: 0920  Group Topic: Community Meeting    [facility] ACUTE  1    Allie Escalante, ERICA    Type of Group: Community Meeting      Patient's Goal:  Patient will be able to id staffing assignments, expectations of patients, and general information re: floor rules. Will be prompted to share goal for the day.     Notes:  Patient appeared to be an active listener, taking in information presented and was prompted to share goal for the day.    Status After Intervention:  Improved    Participation Level: Active Listener    Participation Quality: Attentive      Speech:  normal      Thought Process/Content: Logical      Affective Functioning: Congruent      Mood: euthymic      Level of consciousness:  Alert and Oriented x4      Response to Learning: Able to verbalize/acknowledge new learning, Able to retain information, and Progressing to goal      Endings: None Reported    Modes of Intervention: Support, Socialization, and Exploration      Discipline Responsible: Psychoeducational Specialist      Signature:  ERICA Rico

## 2025-01-30 NOTE — PLAN OF CARE
Patient denies SI/HI/AVH. Endorses some anxiety R/T wanting to discharge. Denies depression. Denies racing thoughts. No paranoia or delusions reported or observed. Appears flat but brightens during assessment. Reports appetite and sleep are good. Patient is taking medications without issues and is attending groups. Remains in control of behaviors.     Problem: Risk for Elopement  Goal: Patient will not exit the unit/facility without proper excort  Outcome: Adequate for Discharge     Problem: Anxiety  Goal: Will report anxiety at manageable levels  1/30/2025 1155 by Indigo Chandler RN  Outcome: Adequate for Discharge  1/29/2025 2317 by Yaneli Lorenzana RN  Outcome: Progressing     Problem: Coping  Goal: Pt/Family able to verbalize concerns and demonstrate effective coping strategies  1/30/2025 1155 by Indigo Chandler RN  Outcome: Adequate for Discharge  1/29/2025 2317 by Yaneli Lorenzana RN  Outcome: Progressing     Problem: Death & Dying  Goal: Pt/Family communicate acceptance of impending death and feel psychological comfort and peace  Outcome: Adequate for Discharge     Problem: Change in Body Image  Goal: Pt/Family communicate acceptance of loss or change in body image and feel psychological comfort and peace  Outcome: Adequate for Discharge     Problem: Decision Making  Goal: Pt/Family able to effectively weigh alternatives and participate in decision making related to treatment and care  1/30/2025 1155 by Indigo Chandler RN  Outcome: Adequate for Discharge  1/29/2025 2317 by Yaneli Lorenzana RN  Outcome: Progressing     Problem: Confusion  Goal: Confusion, delirium, dementia, or psychosis is improved or at baseline  Outcome: Adequate for Discharge     Problem: Behavior  Goal: Pt/Family maintain appropriate behavior and adhere to behavioral management agreement, if implemented  Outcome: Adequate for Discharge     Problem: Depression/Self Harm  Goal: Effect of psychiatric condition will be minimized  and patient will be protected from self harm  1/30/2025 1155 by Indigo Chandler, RN  Outcome: Adequate for Discharge  1/29/2025 2317 by Yaneli Lorenzana, RN  Outcome: Progressing     Problem: Abuse/Neglect  Goal: Pt/Caregiver aware of resources to assist with issues of abuse and neglect  Outcome: Adequate for Discharge     Problem: Drug Abuse/Detox  Goal: Will have no detox symptoms and will verbalize plan for changing drug-related behavior  Outcome: Adequate for Discharge     Problem: Spiritual Care  Goal: Pt/Family able to move forward in process of forgiving self, others, and/or higher power  Outcome: Adequate for Discharge     Problem: Involuntary Admit  Goal: Will cooperate with staff recommendations and doctor's orders and will demonstrate appropriate behavior  Outcome: Adequate for Discharge     Problem: Pain  Goal: Verbalizes/displays adequate comfort level or baseline comfort level  Outcome: Adequate for Discharge

## 2025-01-30 NOTE — DISCHARGE SUMMARY
DISCHARGE SUMMARY      Patient ID:  Raegan Matamoros  [id number]  24 y.o.  2000    Admit date: 1/27/2025    Discharge date and time: 1/30/2025    Admitting Physician: Benedict Newberry MD     Discharge Physician: Dr Rohith ALDRICH    Discharge Diagnoses:   Patient Active Problem List   Diagnosis    Moderate episode of recurrent major depressive disorder (HCC)    Nausea vomiting and diarrhea    Bipolar 2 disorder, major depressive episode (HCC)    Cluster B personality disorder (HCC)       Admission Condition: poor    Discharged Condition: stable    Admission Circumstance: Patient name: Raegan Matamoros  Patient's past mental health and addiction history: History of bipolar 2 disorder, cluster B personality disorder with previous inpatient psychiatric hospitalization  Patient's presentation to the ED and why the patient needs admission: Presented to the ED via overdose suicide attempt reportedly took 10 tablets of hydroxyzine 25 mg  Legal status:  []  Voluntary  [x]  Involuntary  []  Probate  Triggering/precipitating events: Multiple stressors including finances as well as home issues  Duration of triggering/precipitating events: Prior to arrival         PAST MEDICAL/PSYCHIATRIC HISTORY:   Past Medical History:   Diagnosis Date    Moderate episode of recurrent major depressive disorder (HCC) 11/26/2021       FAMILY/SOCIAL HISTORY:  Family History   Problem Relation Age of Onset    Other Maternal Grandmother         enlarged thyroid    Kidney Disease Maternal Grandfather      Social History     Socioeconomic History    Marital status: Single     Spouse name: Not on file    Number of children: Not on file    Years of education: Not on file    Highest education level: Not on file   Occupational History    Not on file   Tobacco Use    Smoking status: Never     Passive exposure: Yes    Smokeless tobacco: Never   Vaping Use    Vaping status: Never Used   Substance and Sexual Activity    Alcohol use: No    Drug use: No    Sexual  stepping down to a lower level of care.  This patient no longer meets criteria for inpatient hospitalization and they will be referred to the community for ongoing mental health treatment .    No AVH or paranoid thought  No Hopeless or worthless feeling  No active SI/HI  Appetite:  [x] Normal  [] Increased  [] Decreased    Sleep:       [x] Normal  [] Fair       [] Poor            Energy:    [x] Normal  [] Increased  [] Decreased     SI [] Present  [x] Absent  HI  []Present  [x] Absent   Aggression:  [] yes  [x] no  Patient is [x] able  [] unable to CONTRACT FOR SAFETY   Medication side effects(SE):  [x] None(Psych. Meds.) [] Other      Mental Status Examination on discharge:    Level of consciousness:  within normal limits   Appearance:  well-appearing  Behavior/Motor:  no abnormalities noted  Attitude toward examiner:  attentive and good eye contact  Speech:  spontaneous, normal rate and normal volume   Mood: \"I feel a lot better.\"    Affect: Bright, appropriate and pleasant  Thought processes: Linear without flight of ideas loose associations  Thought content: Devoid of any auditory or visual hallucinations delusions or any other perceptual abnormalities.  Denies suicidal or homicidal ideations intent or plan  Cognition:  oriented to person, place, and time   Concentration intact  Memory intact  Insight good   Judgement fair   Fund of Knowledge adequate      ASSESSMENT:  Patient symptoms are:  [x] Well controlled  [x] Improving  [] Worsening  [] No change    Reason for more than one antipsychotic:  [] N/A  [] 3 Failed Monotherapy attempts (Drugs tried:)  [] Crossover to a new antipsychotic  [] Taper to Monotherapy from Polypharmacy  [] Augmentation of clozapine therapy due to treatment resistance to single therapy    Diagnosis:  Principal Problem:    Bipolar 2 disorder, major depressive episode (HCC)  Active Problems:    Cluster B personality disorder (HCC)  Resolved Problems:    * No resolved hospital problems.

## 2025-02-11 ENCOUNTER — TELEPHONE (OUTPATIENT)
Dept: FAMILY MEDICINE CLINIC | Age: 25
End: 2025-02-11

## 2025-02-23 ENCOUNTER — APPOINTMENT (OUTPATIENT)
Dept: GENERAL RADIOLOGY | Age: 25
End: 2025-02-23
Payer: COMMERCIAL

## 2025-02-23 LAB
ALBUMIN SERPL-MCNC: 4.9 G/DL (ref 3.5–5.2)
ALP SERPL-CCNC: 80 U/L (ref 35–104)
ALT SERPL-CCNC: 11 U/L (ref 0–32)
ANION GAP SERPL CALCULATED.3IONS-SCNC: 12 MMOL/L (ref 7–16)
AST SERPL-CCNC: 15 U/L (ref 0–31)
BASOPHILS # BLD: 0.02 K/UL (ref 0–0.2)
BASOPHILS NFR BLD: 0 % (ref 0–2)
BILIRUB SERPL-MCNC: 0.4 MG/DL (ref 0–1.2)
BILIRUB UR QL STRIP: NEGATIVE
BUN SERPL-MCNC: 9 MG/DL (ref 6–20)
CALCIUM SERPL-MCNC: 9.3 MG/DL (ref 8.6–10.2)
CHLORIDE SERPL-SCNC: 99 MMOL/L (ref 98–107)
CLARITY UR: CLEAR
CO2 SERPL-SCNC: 21 MMOL/L (ref 22–29)
COLOR UR: YELLOW
CREAT SERPL-MCNC: 0.8 MG/DL (ref 0.5–1)
EOSINOPHIL # BLD: 0.01 K/UL (ref 0.05–0.5)
EOSINOPHILS RELATIVE PERCENT: 0 % (ref 0–6)
ERYTHROCYTE [DISTWIDTH] IN BLOOD BY AUTOMATED COUNT: 12 % (ref 11.5–15)
GFR, ESTIMATED: >90 ML/MIN/1.73M2
GLUCOSE SERPL-MCNC: 125 MG/DL (ref 74–99)
GLUCOSE UR STRIP-MCNC: NEGATIVE MG/DL
HCG, URINE, POC: NEGATIVE
HCT VFR BLD AUTO: 41.3 % (ref 34–48)
HGB BLD-MCNC: 13.9 G/DL (ref 11.5–15.5)
HGB UR QL STRIP.AUTO: ABNORMAL
IMM GRANULOCYTES # BLD AUTO: <0.03 K/UL (ref 0–0.58)
IMM GRANULOCYTES NFR BLD: 0 % (ref 0–5)
KETONES UR STRIP-MCNC: NEGATIVE MG/DL
LEUKOCYTE ESTERASE UR QL STRIP: NEGATIVE
LYMPHOCYTES NFR BLD: 1.21 K/UL (ref 1.5–4)
LYMPHOCYTES RELATIVE PERCENT: 16 % (ref 20–42)
Lab: NORMAL
MCH RBC QN AUTO: 28 PG (ref 26–35)
MCHC RBC AUTO-ENTMCNC: 33.7 G/DL (ref 32–34.5)
MCV RBC AUTO: 83.1 FL (ref 80–99.9)
MONOCYTES NFR BLD: 0.87 K/UL (ref 0.1–0.95)
MONOCYTES NFR BLD: 11 % (ref 2–12)
NEGATIVE QC PASS/FAIL: NORMAL
NEUTROPHILS NFR BLD: 72 % (ref 43–80)
NEUTS SEG NFR BLD: 5.48 K/UL (ref 1.8–7.3)
NITRITE UR QL STRIP: NEGATIVE
PH UR STRIP: 6 [PH] (ref 5–8)
PLATELET # BLD AUTO: 321 K/UL (ref 130–450)
PMV BLD AUTO: 9.6 FL (ref 7–12)
POSITIVE QC PASS/FAIL: NORMAL
POTASSIUM SERPL-SCNC: 3.7 MMOL/L (ref 3.5–5)
PROT SERPL-MCNC: 8.5 G/DL (ref 6.4–8.3)
PROT UR STRIP-MCNC: NEGATIVE MG/DL
RBC # BLD AUTO: 4.97 M/UL (ref 3.5–5.5)
RBC #/AREA URNS HPF: NORMAL /HPF
SODIUM SERPL-SCNC: 132 MMOL/L (ref 132–146)
SP GR UR STRIP: 1.01 (ref 1–1.03)
SPECIMEN SOURCE: NORMAL
STREP A, MOLECULAR: NEGATIVE
UROBILINOGEN UR STRIP-ACNC: 0.2 EU/DL (ref 0–1)
WBC #/AREA URNS HPF: NORMAL /HPF
WBC OTHER # BLD: 7.6 K/UL (ref 4.5–11.5)

## 2025-02-23 PROCEDURE — 96374 THER/PROPH/DIAG INJ IV PUSH: CPT

## 2025-02-23 PROCEDURE — 85025 COMPLETE CBC W/AUTO DIFF WBC: CPT

## 2025-02-23 PROCEDURE — 96375 TX/PRO/DX INJ NEW DRUG ADDON: CPT

## 2025-02-23 PROCEDURE — 0202U NFCT DS 22 TRGT SARS-COV-2: CPT

## 2025-02-23 PROCEDURE — 71045 X-RAY EXAM CHEST 1 VIEW: CPT

## 2025-02-23 PROCEDURE — 6360000002 HC RX W HCPCS: Performed by: NURSE PRACTITIONER

## 2025-02-23 PROCEDURE — 2580000003 HC RX 258: Performed by: NURSE PRACTITIONER

## 2025-02-23 PROCEDURE — 87651 STREP A DNA AMP PROBE: CPT

## 2025-02-23 PROCEDURE — 81001 URINALYSIS AUTO W/SCOPE: CPT

## 2025-02-23 PROCEDURE — 80053 COMPREHEN METABOLIC PANEL: CPT

## 2025-02-23 PROCEDURE — 99284 EMERGENCY DEPT VISIT MOD MDM: CPT

## 2025-02-23 PROCEDURE — 6370000000 HC RX 637 (ALT 250 FOR IP): Performed by: NURSE PRACTITIONER

## 2025-02-23 RX ORDER — KETOROLAC TROMETHAMINE 30 MG/ML
30 INJECTION, SOLUTION INTRAMUSCULAR; INTRAVENOUS ONCE
Status: COMPLETED | OUTPATIENT
Start: 2025-02-23 | End: 2025-02-23

## 2025-02-23 RX ORDER — 0.9 % SODIUM CHLORIDE 0.9 %
1000 INTRAVENOUS SOLUTION INTRAVENOUS ONCE
Status: COMPLETED | OUTPATIENT
Start: 2025-02-23 | End: 2025-02-24

## 2025-02-23 RX ORDER — ONDANSETRON 2 MG/ML
4 INJECTION INTRAMUSCULAR; INTRAVENOUS ONCE
Status: COMPLETED | OUTPATIENT
Start: 2025-02-23 | End: 2025-02-23

## 2025-02-23 RX ORDER — ACETAMINOPHEN 500 MG
1000 TABLET ORAL ONCE
Status: COMPLETED | OUTPATIENT
Start: 2025-02-23 | End: 2025-02-23

## 2025-02-23 RX ADMIN — KETOROLAC TROMETHAMINE 30 MG: 30 INJECTION, SOLUTION INTRAMUSCULAR at 23:01

## 2025-02-23 RX ADMIN — SODIUM CHLORIDE 1000 ML: 0.9 INJECTION, SOLUTION INTRAVENOUS at 22:56

## 2025-02-23 RX ADMIN — ONDANSETRON 4 MG: 2 INJECTION, SOLUTION INTRAMUSCULAR; INTRAVENOUS at 23:01

## 2025-02-23 RX ADMIN — ACETAMINOPHEN 1000 MG: 500 TABLET ORAL at 23:01

## 2025-02-24 ENCOUNTER — HOSPITAL ENCOUNTER (EMERGENCY)
Age: 25
Discharge: HOME | End: 2025-02-24
Payer: COMMERCIAL

## 2025-02-24 VITALS
WEIGHT: 217 LBS | HEART RATE: 100 BPM | OXYGEN SATURATION: 96 % | DIASTOLIC BLOOD PRESSURE: 63 MMHG | HEIGHT: 65 IN | BODY MASS INDEX: 36.15 KG/M2 | TEMPERATURE: 99.1 F | SYSTOLIC BLOOD PRESSURE: 115 MMHG | RESPIRATION RATE: 18 BRPM

## 2025-02-24 DIAGNOSIS — J10.1 INFLUENZA A: Primary | ICD-10-CM

## 2025-02-24 LAB
B PARAP IS1001 DNA NPH QL NAA+NON-PROBE: NOT DETECTED
B PERT DNA SPEC QL NAA+PROBE: NOT DETECTED
C PNEUM DNA NPH QL NAA+NON-PROBE: NOT DETECTED
FLUAV H3 RNA NPH QL NAA+NON-PROBE: DETECTED
FLUAV RNA NPH QL NAA+NON-PROBE: DETECTED
FLUBV RNA NPH QL NAA+NON-PROBE: NOT DETECTED
HADV DNA NPH QL NAA+NON-PROBE: NOT DETECTED
HCOV 229E RNA NPH QL NAA+NON-PROBE: NOT DETECTED
HCOV HKU1 RNA NPH QL NAA+NON-PROBE: NOT DETECTED
HCOV NL63 RNA NPH QL NAA+NON-PROBE: NOT DETECTED
HCOV OC43 RNA NPH QL NAA+NON-PROBE: NOT DETECTED
HMPV RNA NPH QL NAA+NON-PROBE: NOT DETECTED
HPIV1 RNA NPH QL NAA+NON-PROBE: NOT DETECTED
HPIV2 RNA NPH QL NAA+NON-PROBE: NOT DETECTED
HPIV3 RNA NPH QL NAA+NON-PROBE: NOT DETECTED
HPIV4 RNA NPH QL NAA+NON-PROBE: NOT DETECTED
M PNEUMO DNA NPH QL NAA+NON-PROBE: NOT DETECTED
RSV RNA NPH QL NAA+NON-PROBE: NOT DETECTED
RV+EV RNA NPH QL NAA+NON-PROBE: NOT DETECTED
SARS-COV-2 RNA NPH QL NAA+NON-PROBE: NOT DETECTED
SPECIMEN DESCRIPTION: ABNORMAL

## 2025-02-24 RX ORDER — ACETAMINOPHEN 325 MG/1
650 TABLET ORAL EVERY 6 HOURS PRN
Qty: 20 TABLET | Refills: 0 | Status: SHIPPED | OUTPATIENT
Start: 2025-02-24

## 2025-02-24 RX ORDER — BROMPHENIRAMINE MALEATE, PSEUDOEPHEDRINE HYDROCHLORIDE, AND DEXTROMETHORPHAN HYDROBROMIDE 2; 30; 10 MG/5ML; MG/5ML; MG/5ML
5 SYRUP ORAL 4 TIMES DAILY PRN
Qty: 120 ML | Refills: 0 | Status: SHIPPED | OUTPATIENT
Start: 2025-02-24 | End: 2025-03-01

## 2025-02-24 RX ORDER — IBUPROFEN 800 MG/1
800 TABLET, FILM COATED ORAL EVERY 8 HOURS PRN
Qty: 21 TABLET | Refills: 0 | Status: SHIPPED | OUTPATIENT
Start: 2025-02-24 | End: 2025-03-03

## 2025-02-24 RX ORDER — OSELTAMIVIR PHOSPHATE 75 MG/1
75 CAPSULE ORAL 2 TIMES DAILY
Qty: 10 CAPSULE | Refills: 0 | Status: SHIPPED | OUTPATIENT
Start: 2025-02-24 | End: 2025-03-01

## 2025-02-24 NOTE — ED PROVIDER NOTES
Independent   HPI:  2/23/25, Time: 10:43 PM NATE Matamoros is a 24 y.o. female presenting to the ED for fever.  Patient presents to emergency department with complaints of not feeling well that started yesterday.  Patient reports widespread body aches and pains, fevers, chills, headache, cough, constant nasal congestion but as well as nasal drainage.  Patient reports taking over-the-counter meds without relief.  Patient also does complain of a sore throat and an earache.  She does report that she was recently around her nephew who was diagnosed with influenza.  Patient does not have any specific chest pain or shortness of breath or any abdominal pain as well as no unusual vomiting or diarrhea.  Patient's temp here 100.8.    Review of Systems:   A complete review of systems was performed and pertinent positives and negatives are stated within HPI, all other systems reviewed and are negative.          --------------------------------------------- PAST HISTORY ---------------------------------------------  Past Medical History:  has a past medical history of Moderate episode of recurrent major depressive disorder (HCC).    Past Surgical History:  has no past surgical history on file.    Social History:  reports that she has never smoked. She has been exposed to tobacco smoke. She has never used smokeless tobacco. She reports that she does not drink alcohol and does not use drugs.    Family History: family history includes Kidney Disease in her maternal grandfather; Other in her maternal grandmother.     The patient’s home medications have been reviewed.    Allergies: Patient has no known allergies.    -------------------------------------------------- RESULTS -------------------------------------------------  All laboratory and radiology results have been personally reviewed by myself   LABS:  Results for orders placed or performed during the hospital encounter of 02/23/25   Rapid Strep Screen